# Patient Record
Sex: FEMALE | Race: WHITE | NOT HISPANIC OR LATINO | Employment: FULL TIME | URBAN - METROPOLITAN AREA
[De-identification: names, ages, dates, MRNs, and addresses within clinical notes are randomized per-mention and may not be internally consistent; named-entity substitution may affect disease eponyms.]

---

## 2017-01-10 ENCOUNTER — OFFICE VISIT (OUTPATIENT)
Dept: LAB | Facility: SCHOOL | Age: 32
End: 2017-01-10
Payer: COMMERCIAL

## 2017-01-10 VITALS
TEMPERATURE: 97.3 F | OXYGEN SATURATION: 100 % | HEART RATE: 73 BPM | RESPIRATION RATE: 16 BRPM | DIASTOLIC BLOOD PRESSURE: 80 MMHG | SYSTOLIC BLOOD PRESSURE: 120 MMHG

## 2017-01-10 DIAGNOSIS — R07.0 THROAT PAIN: Primary | ICD-10-CM

## 2017-01-10 LAB — S PYO AG THROAT QL IA.RAPID: NORMAL

## 2017-01-10 PROCEDURE — 87880 STREP A ASSAY W/OPTIC: CPT | Performed by: NURSE PRACTITIONER

## 2017-01-10 PROCEDURE — 99213 OFFICE O/P EST LOW 20 MIN: CPT | Performed by: NURSE PRACTITIONER

## 2017-01-10 RX ORDER — NORETHINDRONE ACETATE AND ETHINYL ESTRADIOL AND FERROUS FUMARATE 5-7-9-7
1 KIT ORAL DAILY
COMMUNITY
End: 2019-04-12 | Stop reason: ALTCHOICE

## 2017-01-10 NOTE — MR AVS SNAPSHOT
After Visit Summary   1/10/2017    Merlyn Markham    MRN: [id number]           Patient Information     Date Of Birth          1985        Visit Information        Provider Department      1/10/2017 3:30 PM Carolina Castaneda APRN [facility]        Today's Diagnoses     Throat pain    -  1       Care Instructions    Thank you for using the [facility].  If there is no improvement of your condition, please call and schedule an appointment with your primary care provider.    Strep was negative.              Sore Throat              What is a sore throat?   Sore throat is a common symptom that ranges in severity from just a sense of scratchiness to severe pain.   Pharyngitis is the medical term for sore throat.   How does it occur?   Sore throat is caused by inflammation of the throat (pharynx). The pharynx is the area behind the tonsils. A sore throat may be the first symptom of usually mild illnesses such as a cold or the flu or of more severe illnesses such as mononucleosis or scarlet fever.   A sore throat that comes on suddenly is called acute pharyngitis. It can be caused by bacteria or viruses. A sore throat that lasts for a long time is called chronic pharyngitis. It occurs when a respiratory, sinus, or mouth infection spreads to the throat.   Other causes of sore throats include:   hay fever   cigarette smoking or secondhand smoke   breathing heavily polluted air or chemical fumes   swallowing sharp foods that hurt the lining of the throat, such as a tortilla chip   dry air   heartburn (gastric reflux)   postnasal drip from draining sinuses.   What are the symptoms?   Symptoms may include:   a raw feeling in the throat that makes breathing, swallowing, and speaking painful   redness of the throat   fever   hoarseness   pus in your throat   tender, swollen glands (lymph nodes) in your neck   earache (you may feel pain in your ears even  though the problem is in your throat).   How is it diagnosed?   Your healthcare provider will ask about your recent medical history and your symptoms and examine your throat. Your provider also will examine you for signs of other illness, such as sinus, chest, or ear infections.   Just by looking at your throat, it is often hard for your healthcare provider to decide whether a virus or bacteria are causing your sore throat. Your provider may swab your throat to test for strep infection.   How is it treated?   Usually no specific medical treatment is needed if a virus is causing the sore throat. The throat most often gets better on its own within 5 to 7 days. Antibiotic medicine does not cure viral pharyngitis.   For acute pharyngitis caused by bacteria, your healthcare provider may prescribe an antibiotic.   For chronic pharyngitis, your provider will look for other causes, such as allergies.   How long will the effects last?   Viral pharyngitis often goes away in 5 to 7 days.   If you have bacterial pharyngitis, you will feel better after you have taken antibiotics for 2 to 3 days. You must, though, take all of your antibiotic even when you are feeling better. If you don't take all of it, your sore throat could come back.   How can I take care of myself?   Do not smoke.   Avoid secondhand smoke and other air pollutants.   Use a cool mist humidifier to add moisture to the air.   Get plenty of rest.   You may want to rest your throat by talking less and eating a diet that is mostly liquid or soft for a day or two. Avoid salty or spicy foods and citrus fruits.   Nonprescription throat lozenges and mouthwashes should help relieve the soreness.   Gargling with warm saltwater and drinking warm liquids may help. (You can make a saltwater solution by adding 1/4 teaspoon of salt to 8 ounces, or 240 mL, of warm water.)   A nonprescription pain reliever such as aspirin, acetaminophen, or ibuprofen may ease general aches and  pains. Check with your healthcare provider before you give any medicine that contains aspirin or salicylates to a child or teen. This includes medicines like baby aspirin, some cold medicines, and Pepto Bismol. Children and teens who take aspirin are at risk for a serious illness called Reye's syndrome.   If your sore throat lasts for more than a few days, call your healthcare provider.   How can I prevent a sore throat?   The following suggestions may help prevent a sore throat:   Don't share eating and drinking utensils with others.   Wash your hands often.   Don't let your nose or mouth touch public telephones or drinking fountains.   Avoid close contact with other people who have a sore throat.   Stay indoors as much as possible on high-pollution days.   Don't stay in areas where there is heavy smoke from cigarettes.   Use a humidifier in your home if the air is quite dry.               Published by [employer].  This content is reviewed periodically and is subject to change as new health information becomes available. The information is intended to inform and educate and is not a replacement for medical evaluation, advice, diagnosis or treatment by a healthcare professional.   Developed by [employer].   ? 2010 [employer] and/or its affiliates. All Rights Reserved.   Copyright   Clinical Reference Systems 2011             Follow-ups after your visit        Who to contact     If you have questions or need follow up information about today's clinic visit or your schedule please contact [place] [address] directly at [phone].  Normal or non-critical lab and imaging results will be communicated to you by MyChart, letter or phone within 4 business days after the clinic has received the results. If you do not hear from us within 7 days, please contact the clinic through MyChart or phone. If you have a critical or abnormal lab result, we will notify you by phone as soon as possible.  Submit  "refill requests through [email or web address] or call your pharmacy and they will forward the refill request to us. Please allow 3 business days for your refill to be completed.          Additional Information About Your Visit        [employer] Information     [email or web address] lets you send messages to your doctor, view your test results, renew your prescriptions, schedule appointments and more. To sign up, go to [email or web address] . Click on \"Log in\" on the left side of the screen, which will take you to the Welcome page. Then click on \"Sign up Now\" on the right side of the page.     You will be asked to enter the access code listed below, as well as some personal information. Please follow the directions to create your username and password.     Your access code is: [email or web address]  Expires: 4/10/2017  3:43 PM     Your access code will  in 90 days. If you need help or a new code, please call your [facility] or [phone].        Care EveryWhere ID     This is your Care EveryWhere ID. This could be used by other organizations to access your [facility] medical records  [id number]        Your Vitals Were     Pulse Temperature Respirations Pulse Oximetry Breastfeeding?       73 97.3  F (36.3  C) (Tympanic) 16 100% No        Blood Pressure from Last 3 Encounters:   01/10/17 120/80   16 132/80    Weight from Last 3 Encounters:   16 208 lb 12.8 oz (94.711 kg)              We Performed the Following     Rapid strep screen        Primary Care Provider Office Phone #    [facility] [phone]       No address on file        Thank you!     Thank you for choosing [place] [address]  for your care. Our goal is always to provide you with excellent care. Hearing back from our patients is one way we can continue to improve our services. Please take a few minutes to complete the written survey that you may receive in the mail after your visit with us. Thank you!             Your Updated Medication " List - Protect others around you: Learn how to safely use, store and throw away your medicines at www.disposemymeds.org.          This list is accurate as of: 1/10/17  3:43 PM.  Always use your most recent med list.                   Brand Name Dispense Instructions for use    norethindrone-ethinyl estradiol-iron 1-20/1-30/1-35 MG-MCG per tablet    ESTROSTEP FE     Take 1 tablet by mouth daily

## 2017-01-10 NOTE — NURSING NOTE
"Chief Complaint   Patient presents with     URI       Initial /80 mmHg  Pulse 73  Temp(Src) 97.3  F (36.3  C) (Tympanic)  Resp 16  SpO2 100%  Breastfeeding? No Estimated body mass index is 31.76 kg/(m^2) as calculated from the following:    Height as of 4/6/16: 5' 8\" (1.727 m).    Weight as of 4/6/16: 208 lb 12.8 oz (94.711 kg).  BP completed using cuff size: regular  "

## 2017-01-10 NOTE — PATIENT INSTRUCTIONS
Thank you for using the [facility].  If there is no improvement of your condition, please call and schedule an appointment with your primary care provider.    Strep was negative.              Sore Throat              What is a sore throat?   Sore throat is a common symptom that ranges in severity from just a sense of scratchiness to severe pain.   Pharyngitis is the medical term for sore throat.   How does it occur?   Sore throat is caused by inflammation of the throat (pharynx). The pharynx is the area behind the tonsils. A sore throat may be the first symptom of usually mild illnesses such as a cold or the flu or of more severe illnesses such as mononucleosis or scarlet fever.   A sore throat that comes on suddenly is called acute pharyngitis. It can be caused by bacteria or viruses. A sore throat that lasts for a long time is called chronic pharyngitis. It occurs when a respiratory, sinus, or mouth infection spreads to the throat.   Other causes of sore throats include:   hay fever   cigarette smoking or secondhand smoke   breathing heavily polluted air or chemical fumes   swallowing sharp foods that hurt the lining of the throat, such as a tortilla chip   dry air   heartburn (gastric reflux)   postnasal drip from draining sinuses.   What are the symptoms?   Symptoms may include:   a raw feeling in the throat that makes breathing, swallowing, and speaking painful   redness of the throat   fever   hoarseness   pus in your throat   tender, swollen glands (lymph nodes) in your neck   earache (you may feel pain in your ears even though the problem is in your throat).   How is it diagnosed?   Your healthcare provider will ask about your recent medical history and your symptoms and examine your throat. Your provider also will examine you for signs of other illness, such as sinus, chest, or ear infections.   Just by looking at your throat, it is often hard for your healthcare provider to decide  whether a virus or bacteria are causing your sore throat. Your provider may swab your throat to test for strep infection.   How is it treated?   Usually no specific medical treatment is needed if a virus is causing the sore throat. The throat most often gets better on its own within 5 to 7 days. Antibiotic medicine does not cure viral pharyngitis.   For acute pharyngitis caused by bacteria, your healthcare provider may prescribe an antibiotic.   For chronic pharyngitis, your provider will look for other causes, such as allergies.   How long will the effects last?   Viral pharyngitis often goes away in 5 to 7 days.   If you have bacterial pharyngitis, you will feel better after you have taken antibiotics for 2 to 3 days. You must, though, take all of your antibiotic even when you are feeling better. If you don't take all of it, your sore throat could come back.   How can I take care of myself?   Do not smoke.   Avoid secondhand smoke and other air pollutants.   Use a cool mist humidifier to add moisture to the air.   Get plenty of rest.   You may want to rest your throat by talking less and eating a diet that is mostly liquid or soft for a day or two. Avoid salty or spicy foods and citrus fruits.   Nonprescription throat lozenges and mouthwashes should help relieve the soreness.   Gargling with warm saltwater and drinking warm liquids may help. (You can make a saltwater solution by adding 1/4 teaspoon of salt to 8 ounces, or 240 mL, of warm water.)   A nonprescription pain reliever such as aspirin, acetaminophen, or ibuprofen may ease general aches and pains. Check with your healthcare provider before you give any medicine that contains aspirin or salicylates to a child or teen. This includes medicines like baby aspirin, some cold medicines, and Pepto Bismol. Children and teens who take aspirin are at risk for a serious illness called Reye's syndrome.   If your sore throat lasts for more than a few days, call your  healthcare provider.   How can I prevent a sore throat?   The following suggestions may help prevent a sore throat:   Don't share eating and drinking utensils with others.   Wash your hands often.   Don't let your nose or mouth touch public telephones or drinking fountains.   Avoid close contact with other people who have a sore throat.   Stay indoors as much as possible on high-pollution days.   Don't stay in areas where there is heavy smoke from cigarettes.   Use a humidifier in your home if the air is quite dry.               Published by [employer].  This content is reviewed periodically and is subject to change as new health information becomes available. The information is intended to inform and educate and is not a replacement for medical evaluation, advice, diagnosis or treatment by a healthcare professional.   Developed by [employer].   ? 2010 [employer] and/or its affiliates. All Rights Reserved.   Copyright   Clinical Reference Systems 2011

## 2017-01-10 NOTE — PROGRESS NOTES
SUBJECTIVE:                                                    Merlyn Markham is a 31 year old female who presents to clinic today for the following health issues:      ENT Symptoms             Symptoms: cc Present Absent Comment   Fever/Chills   x    Fatigue  x     Muscle Aches   x    Eye Irritation   x    Sneezing  x     Nasal Malvin/Drg  x     Sinus Pressure/Pain   x    Loss of smell   x    Dental pain   x    Sore Throat x x     Swollen Glands  x     Ear Pain/Fullness  x     Cough   x    Wheeze   x    Chest Pain   x    Shortness of breath   x    Rash   x    Other         Symptom duration:  24 hours   Symptom severity:  mod   Treatments tried:  Tylenol   Contacts:  school             Problem list and histories reviewed & adjusted, as indicated.  Additional history: she wants to make sure she doesn't have strep.  She had it last year and really didn't have much for symptoms at that time.  24 hours of a steady sore throat and is otherwise feeling pretty well.  Has had flu shot this season.    There is no problem list on file for this patient.    History reviewed. No pertinent past surgical history.    Social History   Substance Use Topics     Smoking status: Never Smoker      Smokeless tobacco: Not on file     Alcohol Use: Yes      Comment: very little     History reviewed. No pertinent family history.      Current Outpatient Prescriptions   Medication Sig Dispense Refill     norethindrone-ethinyl estradiol-iron (ESTROSTEP FE) 1-20/1-30/1-35 MG-MCG per tablet Take 1 tablet by mouth daily       No Known Allergies    10 point ROS of systems including Constitutional, Eyes, Respiratory, Cardiovascular, Gastroenterology, Genitourinary, Integumentary, Muscularskeletal, Psychiatric were all negative except for pertinent positives noted in my HPI.    OBJECTIVE:                                                    /80 mmHg  Pulse 73  Temp(Src) 97.3  F (36.3  C) (Tympanic)  Resp 16  SpO2 100%  Breastfeeding? No  There  is no weight on file to calculate BMI.  GENERAL: healthy, alert and no distress  HENT: ear canals and TM's normal, pharynx with mild erythema, tonsils normal   NECK: no adenopathy, no asymmetry  RESP: lungs clear to auscultation - no rales, rhonchi or wheezes  CV: regular rate and rhythm, normal S1 S2, no S3 or S4, no murmur  MS: no gross musculoskeletal defects noted      Diagnostic Test Results:  Results for orders placed or performed in visit on 01/10/17 (from the past 24 hour(s))   Rapid strep screen   Result Value Ref Range    Rapid Strep A Screen neg neg        ASSESSMENT/PLAN:                                                            1. Throat pain    - Rapid strep screen    See Patient Instructions  Strep was negative, continue with conservative treatment and watchful waiting. See handout about sore throat.    RAMOS Oconnor [facility]

## 2018-02-26 ENCOUNTER — OFFICE VISIT (OUTPATIENT)
Dept: LAB | Facility: SCHOOL | Age: 33
End: 2018-02-26
Payer: COMMERCIAL

## 2018-02-26 VITALS
TEMPERATURE: 97.1 F | SYSTOLIC BLOOD PRESSURE: 110 MMHG | HEART RATE: 113 BPM | WEIGHT: 262.2 LBS | RESPIRATION RATE: 18 BRPM | BODY MASS INDEX: 38.83 KG/M2 | OXYGEN SATURATION: 98 % | DIASTOLIC BLOOD PRESSURE: 86 MMHG | HEIGHT: 69 IN

## 2018-02-26 DIAGNOSIS — Z00.00 WELLNESS EXAMINATION: Primary | ICD-10-CM

## 2018-02-26 PROCEDURE — 99213 OFFICE O/P EST LOW 20 MIN: CPT

## 2018-02-26 NOTE — PATIENT INSTRUCTIONS
"Merlyn Markham has completed a Wellness Check at the [facility] on 2/26/2018.      ____________________________________________  [employer] PROVIDER                                                                               Wellness Visit Recommendations:      See your regular primary care health provider every year in order to help stay healthy.    Review health changes.     Review your medicines if your doctor has prescribed any.    Talk to your provider about how often to have your cholesterol checked.    If you are at risk for diabetes, you should have a diabetes test (fasting glucose).    Shots: Get a flu shot each year. Get a tetanus shot every 10 years.     Review with your primary care provider other immunizations that you may need based on your age and/or medical/surgical history    Nutrition:     Eat at least 5 servings of fruits and vegetables each day.    Eat whole-grain bread, whole-wheat pasta and brown rice instead of white grains and rice.    Preventive Care to be reviewed by your primary care provider:    Females:        Cervical Cancer Screening                          Breast Cancer Screening                          Colon Cancer Screening  Males:             Prostrate Cancer Screening                          Colon Cancer Screening      Lifestyle:    Exercise at least 150 minutes a week (30 minutes a day, 5 days of the week). This will help you control your weight and help prevent disease or manage disease.    Limit alcohol to one drink per day or less depending on your past medical history.    No smoking.     Wear sunscreen to prevent skin cancer.    See your dentist every six months for an exam and cleaning.    Today's Vital Signs:  /86 (BP Location: Right arm, Patient Position: Sitting, Cuff Size: Adult Large)  Pulse 113  Temp 97.1  F (36.2  C) (Tympanic)  Resp 18  Ht 5' 9\" (1.753 m)  Wt 262 lb 3.2 oz (118.9 kg)  SpO2 98%  BMI 38.72 " kg/m2

## 2018-02-26 NOTE — PROGRESS NOTES
"  SUBJECTIVE:  CC: Merlyn Markham is an 32 year old woman who presents for Wellness Check at [facility] [id number].     Review of Healthy Lifestyle: Currently 32 weeks pregnant.    Do you get at least three servings of calcium containing foods daily (dairy, green leafy vegetables, etc.)? yes     Do you have a high-fiber diet? yes     Amount of exercise or daily activities, outside of work: 2 day(s) per week for 1 hour    Do you wear sunscreen on a regular basis? Yes      Are you taking your medications regularly Yes     Have you had an eye exam in the past two years? unsure    Do you see a dentist twice per year? yes    Do you have sleep apnea, excessive snoring or excessive daytime drowsiness? no    Do you use tobacco in any form? no       OBJECTIVE:    Vitals: /86 (BP Location: Right arm, Patient Position: Sitting, Cuff Size: Adult Large)  Pulse 113  Temp 97.1  F (36.2  C) (Tympanic)  Resp 18  Ht 5' 9\" (1.753 m)  Wt 262 lb 3.2 oz (118.9 kg)  SpO2 98%  BMI 38.72 kg/m2  BMI= Body mass index is 38.72 kg/(m^2).    HEARING: Right Ear:        500Hz:  RESPONSE- on Level: 40 db   1000 Hz: RESPONSE- on Level: tone not heard   2000 Hz: RESPONSE- on Level: 40 db   4000 Hz: RESPONSE- on Level:   20 db     Left Ear:       500Hz:  RESPONSE- on Level: 40 db   1000 Hz: RESPONSE- on Level: tone not heard   2000 Hz: RESPONSE- on Level: 40 db   4000 Hz: RESPONSE- on Level:   20 db     VISION:  Right eye:  20/20  Left eye:     20/16  Corrective lenses?  Yes    Medication Reconciliation: complete    ASSESSMENT/PLAN:    COUNSELING:      reports that she has never smoked. She has never used smokeless tobacco.    Estimated body mass index is 38.72 kg/(m^2) as calculated from the following:    Height as of this encounter: 5' 9\" (1.753 m).    Weight as of this encounter: 262 lb 3.2 oz (118.9 kg).   Weight management plan: Patient was referred to their PCP to discuss a diet and exercise plan.    Counseling " Resources  [employer]  [email or web address]/      RAMOS Najera CNP    [employer] PROVIDER  [facility]

## 2018-02-26 NOTE — NURSING NOTE
"Chief Complaint   Patient presents with     Wellness Visit       Initial /86 (BP Location: Right arm, Patient Position: Sitting, Cuff Size: Adult Large)  Pulse 113  Temp 97.1  F (36.2  C) (Tympanic)  Resp 18  Ht 5' 9\" (1.753 m)  Wt 262 lb 3.2 oz (118.9 kg)  SpO2 98%  BMI 38.72 kg/m2 Estimated body mass index is 38.72 kg/(m^2) as calculated from the following:    Height as of this encounter: 5' 9\" (1.753 m).    Weight as of this encounter: 262 lb 3.2 oz (118.9 kg).  Medication Reconciliation: complete  "

## 2018-03-09 ENCOUNTER — OFFICE VISIT (OUTPATIENT)
Dept: LAB | Facility: SCHOOL | Age: 33
End: 2018-03-09
Payer: COMMERCIAL

## 2018-03-09 VITALS
SYSTOLIC BLOOD PRESSURE: 118 MMHG | BODY MASS INDEX: 38.8 KG/M2 | DIASTOLIC BLOOD PRESSURE: 78 MMHG | HEART RATE: 86 BPM | OXYGEN SATURATION: 97 % | WEIGHT: 262 LBS | TEMPERATURE: 97.7 F | HEIGHT: 69 IN

## 2018-03-09 DIAGNOSIS — J06.9 VIRAL URI: Primary | ICD-10-CM

## 2018-03-09 PROCEDURE — 99213 OFFICE O/P EST LOW 20 MIN: CPT | Performed by: FAMILY MEDICINE

## 2018-03-09 NOTE — PROGRESS NOTES
SUBJECTIVE:   Merlyn Markham is a 32 year old female who presents to clinic today for the following health issues:    ENT Symptoms             Symptoms: cc Present Absent Comment   Fever/Chills  x  Low grade fever last night    Fatigue   x    Muscle Aches   x    Eye Irritation   x    Sneezing   x    Nasal Malvin/Drg   x    Sinus Pressure/Pain   x    Loss of smell   x    Dental pain   x    Sore Throat x x     Swollen Glands   x    Ear Pain/Fullness   x    Cough  x  Mild cough - productive    Wheeze   x    Chest Pain   x    Shortness of breath   x    Rash   x    Other   x      Symptom duration:  1-2 days    Symptom severity:  Moderate    Treatments tried:  none   Contacts:  her son was positive for RSV      Currently 36 weeks pregnant. Baby moving normally, no cramping or spotting.    ROS: 10 point review of systems negative except as per HPI.    PAST MEDICAL HISTORY:  History reviewed. No pertinent past medical history.     ACTIVE MEDICAL PROBLEMS:  There is no problem list on file for this patient.       FAMILY HISTORY:  History reviewed. No pertinent family history.    SOCIAL HISTORY:  Social History     Social History     Marital status: Single     Spouse name: N/A     Number of children: N/A     Years of education: N/A     Occupational History     Not on file.     Social History Main Topics     Smoking status: Never Smoker     Smokeless tobacco: Never Used     Alcohol use No      Comment: very little     Drug use: No     Sexual activity: Yes     Partners: Male     Birth control/ protection: Pill     Other Topics Concern     Not on file     Social History Narrative       MEDICATIONS:  Current Outpatient Prescriptions   Medication Sig Dispense Refill     IRON PO Take 1 tablet by mouth       Prenatal Vit-Fe Fumarate-FA (PRENATAL ONE DAILY PO)        norethindrone-ethinyl estradiol-iron (ESTROSTEP FE) 1-20/1-30/1-35 MG-MCG per tablet Take 1 tablet by mouth daily         ALLERGIES:   No Known Allergies      OBJECTIVE:   "                                                  VITALS: /78  Pulse 86  Temp 97.7  F (36.5  C) (Tympanic)  Ht 5' 9\" (1.753 m)  Wt 262 lb (118.8 kg)  SpO2 97%  Breastfeeding? No  BMI 38.69 kg/m2  GENERAL: Pleasant, well appearing female.  HEENT: PERRL, EOMI, oropharynx normal, TMs normal.  NECK: supple, no thyromegaly or thyroid masses, shotty anterior cervical lymphadenopathy.  CV: RRR, no murmurs, rubs or gallops.  LUNGS: CTAB, normal effort.  ABDOMEN: Gravid. Doptone not available for FHTs. Visible fetal movement.  SKIN: warm and dry without obvious rashes.   EXTREMITIES: No edema.    ASSESSMENT/PLAN:                                                    1. Viral URI  Likely viral in etiology. Discussed OTC symptomatic cares. She was requesting RSV swab since her son has it. Discussed we do not have the swab here in clinic but positive or negative swab would not  which is supportive at this time. Treat fever with tylenol - do not use NSAIDs due to third trimester pregnancy. Follow-up if not improving or if worsening or if any decreased fetal movement or cramping/bleeding.      Follow-up: If not improving or if worsening    "

## 2018-03-09 NOTE — PATIENT INSTRUCTIONS
Treating Viral Respiratory Illness in Children  Viral respiratory illnesses include colds, the flu, and RSV (respiratory syncytial virus). Treatment will focus on relieving your child s symptoms and ensuring that the infection does not get worse. Antibiotics are not effective against viruses. Always see your child s healthcare provider if your child has trouble breathing.    Helping your child feel better    Give your child plenty of fluids, such as water or apple juice.    Make sure your child gets plenty of rest.    Keep your infant s nose clear. Use a rubber bulb suction device to remove mucus as needed. Don't be aggressive when suctioning. This may cause more swelling and discomfort.    Raise the head of your child's bed slightly to make breathing easier.    Run a cool-mist humidifier or vaporizer in your child s room to keep the air moist and nasal passages clear.    Don't let anyone smoke near your child.    Treat your child s fever with acetaminophen. In infants 6 months or older, you may use ibuprofen instead to help reduce the fever. Never give aspirin to a child under age 18. It could cause a rare but serious condition called Reye syndrome.  When to seek medical care  Most children get over colds and flu on their own in time, with rest and care from you. Call your child's healthcare provider if your child:    Has a fever of 100.4 F (38 C) in a baby younger than 3 months    Has a repeated fever of 104 F (40 C) or higher    Has nausea or vomiting, or can t keep even small amounts of liquid down    Hasn t urinated for 6 hours or more, or has dark or strong-smelling urine    Has a harsh cough, a cough that doesn't get better, wheezing, or trouble breathing    Has bad or increasing pain    Develops a skin rash    Is very tired or lethargic    Develops a blue color to the skin around the lips or on the fingers or toes  Date Last Reviewed: 1/1/2017 2000-2017 [employer]. [address] [facility],  [place] [address]. All rights reserved. This information is not intended as a substitute for professional medical care. Always follow your healthcare professional's instructions.

## 2018-03-09 NOTE — MR AVS SNAPSHOT
After Visit Summary   3/9/2018    Merlyn Markham    MRN: [id number]           Patient Information     Date Of Birth          1985        Visit Information        Provider Department      3/9/2018 7:30 AM Provider, [facility]        Care Instructions      Treating Viral Respiratory Illness in Children  Viral respiratory illnesses include colds, the flu, and RSV (respiratory syncytial virus). Treatment will focus on relieving your child s symptoms and ensuring that the infection does not get worse. Antibiotics are not effective against viruses. Always see your child s healthcare provider if your child has trouble breathing.    Helping your child feel better    Give your child plenty of fluids, such as water or apple juice.    Make sure your child gets plenty of rest.    Keep your infant s nose clear. Use a rubber bulb suction device to remove mucus as needed. Don't be aggressive when suctioning. This may cause more swelling and discomfort.    Raise the head of your child's bed slightly to make breathing easier.    Run a cool-mist humidifier or vaporizer in your child s room to keep the air moist and nasal passages clear.    Don't let anyone smoke near your child.    Treat your child s fever with acetaminophen. In infants 6 months or older, you may use ibuprofen instead to help reduce the fever. Never give aspirin to a child under age 18. It could cause a rare but serious condition called Reye syndrome.  When to seek medical care  Most children get over colds and flu on their own in time, with rest and care from you. Call your child's healthcare provider if your child:    Has a fever of 100.4 F (38 C) in a baby younger than 3 months    Has a repeated fever of 104 F (40 C) or higher    Has nausea or vomiting, or can t keep even small amounts of liquid down    Hasn t urinated for 6 hours or more, or has dark or strong-smelling urine    Has a harsh cough, a cough that  "doesn't get better, wheezing, or trouble breathing    Has bad or increasing pain    Develops a skin rash    Is very tired or lethargic    Develops a blue color to the skin around the lips or on the fingers or toes  Date Last Reviewed: 2017-2017 [employer]. [address] [place] [address]. All rights reserved. This information is not intended as a substitute for professional medical care. Always follow your healthcare professional's instructions.                Follow-ups after your visit        Who to contact     If you have questions or need follow up information about today's clinic visit or your schedule please contact [facility] directly at [phone].  Normal or non-critical lab and imaging results will be communicated to you by Imonomihart, letter or phone within 4 business days after the clinic has received the results. If you do not hear from us within 7 days, please contact the clinic through Imonomihart or phone. If you have a critical or abnormal lab result, we will notify you by phone as soon as possible.  Submit refill requests through [employer] or call your pharmacy and they will forward the refill request to us. Please allow 3 business days for your refill to be completed.          Additional Information About Your Visit        ImonomiharMimeo Information     [employer] lets you send messages to your doctor, view your test results, renew your prescriptions, schedule appointments and more. To sign up, go to [email or web address] . Click on \"Log in\" on the left side of the screen, which will take you to the Welcome page. Then click on \"Sign up Now\" on the right side of the page.     You will be asked to enter the access code listed below, as well as some personal information. Please follow the directions to create your username and password.     Your access code is: [email or web address]  Expires: 2018  8:12 AM     Your access code will  in 90 days. If you " "need help or a new code, please call your [place] clinic or [phone].        Care EveryWhere ID     This is your Care EveryWhere ID. This could be used by other organizations to access your [place] medical records  [id number]        Your Vitals Were     Pulse Temperature Height Pulse Oximetry Breastfeeding? BMI (Body Mass Index)    86 97.7  F (36.5  C) (Tympanic) 5' 9\" (1.753 m) 97% No 38.69 kg/m2       Blood Pressure from Last 3 Encounters:   03/09/18 118/78   02/26/18 110/86   01/10/17 120/80    Weight from Last 3 Encounters:   03/09/18 262 lb (118.8 kg)   02/26/18 262 lb 3.2 oz (118.9 kg)   04/06/16 208 lb 12.8 oz (94.7 kg)              Today, you had the following     No orders found for display       Primary Care Provider Office Phone # Fax #    [facility] [phone]       [address] [facility] [address]        Equal Access to Services     [facility]: Hadii aad ku hadasho Soomaali, waaxda luqadaha, qaybta kaalmada adedestin, daysi galindo . So [facility] [phone].    ATENCIÓN: Si habla español, tiene a shah disposición servicios gratuitos de asistencia lingüística. [facility] [phone].    We comply with applicable federal civil rights laws and [place] laws. We do not discriminate on the basis of race, color, national origin, age, disability, sex, sexual orientation, or gender identity.            Thank you!     Thank you for choosing [place] [address]  for your care. Our goal is always to provide you with excellent care. Hearing back from our patients is one way we can continue to improve our services. Please take a few minutes to complete the written survey that you may receive in the mail after your visit with us. Thank you!             Your Updated Medication List - Protect others around you: Learn how to safely use, store and throw away your medicines at www.disposemymeds.org.          This list is accurate as of " 3/9/18  7:53 AM.  Always use your most recent med list.                   Brand Name Dispense Instructions for use Diagnosis    IRON PO      Take 1 tablet by mouth        norethindrone-ethinyl estradiol-iron 1-20/1-30/1-35 MG-MCG per tablet    ESTROSTEP FE     Take 1 tablet by mouth daily        PRENATAL ONE DAILY PO

## 2018-03-09 NOTE — NURSING NOTE
"Initial /78  Pulse 86  Temp 97.7  F (36.5  C) (Tympanic)  Ht 5' 9\" (1.753 m)  Wt 262 lb (118.8 kg)  SpO2 97%  Breastfeeding? No  BMI 38.69 kg/m2 Estimated body mass index is 38.69 kg/(m^2) as calculated from the following:    Height as of this encounter: 5' 9\" (1.753 m).    Weight as of this encounter: 262 lb (118.8 kg). .    Shae Mota, REJI ([facility])  "

## 2018-04-14 ENCOUNTER — OFFICE VISIT (OUTPATIENT)
Dept: URGENT CARE | Facility: URGENT CARE | Age: 33
End: 2018-04-14
Payer: COMMERCIAL

## 2018-04-14 VITALS
WEIGHT: 235.5 LBS | HEIGHT: 69 IN | BODY MASS INDEX: 34.88 KG/M2 | TEMPERATURE: 97.7 F | DIASTOLIC BLOOD PRESSURE: 82 MMHG | SYSTOLIC BLOOD PRESSURE: 146 MMHG | HEART RATE: 98 BPM | OXYGEN SATURATION: 97 %

## 2018-04-14 DIAGNOSIS — N61.0 MASTITIS: Primary | ICD-10-CM

## 2018-04-14 PROCEDURE — 99213 OFFICE O/P EST LOW 20 MIN: CPT | Performed by: NURSE PRACTITIONER

## 2018-04-14 RX ORDER — CEPHALEXIN 500 MG/1
500 CAPSULE ORAL 4 TIMES DAILY
Qty: 40 CAPSULE | Refills: 0 | Status: SHIPPED | OUTPATIENT
Start: 2018-04-14 | End: 2019-04-12

## 2018-04-14 ASSESSMENT — ENCOUNTER SYMPTOMS
NEUROLOGICAL NEGATIVE: 1
HEMATOLOGIC/LYMPHATIC NEGATIVE: 1
GASTROINTESTINAL NEGATIVE: 1
FEVER: 1
PSYCHIATRIC NEGATIVE: 1
RESPIRATORY NEGATIVE: 1
CHILLS: 1
MUSCULOSKELETAL NEGATIVE: 1
ENDOCRINE NEGATIVE: 1
ALLERGIC/IMMUNOLOGIC NEGATIVE: 1
CARDIOVASCULAR NEGATIVE: 1
EYES NEGATIVE: 1

## 2018-04-14 NOTE — NURSING NOTE
"Chief Complaint   Patient presents with     Breast Problem       Initial /82 (BP Location: Right arm, Patient Position: Sitting, Cuff Size: Adult Large)  Pulse 98  Temp 97.7  F (36.5  C) (Oral)  Ht 5' 9\" (1.753 m)  Wt 235 lb 8 oz (106.8 kg)  SpO2 97%  Breastfeeding? Yes  BMI 34.78 kg/m2 Estimated body mass index is 34.78 kg/(m^2) as calculated from the following:    Height as of this encounter: 5' 9\" (1.753 m).    Weight as of this encounter: 235 lb 8 oz (106.8 kg).  Medication Reconciliation: rajendra CUNNINGHAM, Certified Medical Assistant (AAMA)April 14, 2018 12:02 PM      "

## 2018-04-14 NOTE — MR AVS SNAPSHOT
After Visit Summary   4/14/2018    Merlyn Markham    MRN: [id number]           Patient Information     Date Of Birth          1985        Visit Information        Provider Department      4/14/2018 11:55 AM Sophie Downey APRN [facility] [place]        Care Instructions      Mastitis  Mastitis occurs when breast tissue becomes swollen and inflamed. This is almost always due to infection. Mastitis most often affects breastfeeding women during the first 6 weeks after childbirth. For this reason, it s also known as lactation mastitis. Infection may happen after a duct becomes clogged, causing milk to back up in the breast. Mastitis may also occur if bacteria enter the breast through small cracks in the nipple. (Less often, mastitis occurs in women who aren t breastfeeding. If you have mastitis that is not due to breastfeeding, your healthcare provider will give you more information as needed. Treatment may include some of the same home care measures listed below.)  Mastitis may cause flu-like symptoms such as fever, aches, and fatigue. The affected breast may feel painful, warm, tender, firm, or swollen. The skin over the breast may be red (often in a wedge-shaped pattern). You may feel a burning a sensation when breastfeeding.  In most cases, mastitis can be treated with antibiotics. This should clear the infection. If treatment is delayed, a pocket of pus (abscess) can form in the breast tissue. A procedure may then be needed to drain the pus. In severe cases of infection, other treatments may be needed.  Home care  Breastfeeding    It s very important to keep the milk flowing from the infected breast. Continue breastfeeding from both breasts as usual. This will not hurt the baby. If this is too painful, use a breast pump to remove milk from the infected side. This can be fed to your baby or discarded. Note: If you don't continue to breastfeed or pump your breast, bacteria can grow in  the milk that is left in your breast. This can make your infection worse.    Tell your healthcare provider if you have problems with breastfeeding. He or she may suggest changes to your technique, if needed. You may also be referred to a lactation nurse or consultant for support with breastfeeding.  General care    Take any medicines you re prescribed as directed. If you re taking antibiotics, be sure to complete all of the medicine even if you start to feel better. Over-the-counter pain medicines may also be recommended. Don t use breast creams or other products or medicines without talking to your healthcare provider first. Note: If you re concerned about taking medicines while breastfeeding, talk to your healthcare provider.    Rest as often as needed. Also be sure to drink plenty of fluids.    To help relieve pain and swelling, heat or ice may be used. Apply as often as directed by your provider.    Heat: Place a warm compress on the breast. Use a towel soaked in hot water, a heating pad, or a hot water bottle.    Cold: Place a cold compress on the breast. Use an ice pack or bag of ice wrapped in a thin towel. Never place a cold source directly on the skin.  Follow-up care  Follow up with your healthcare provider as advised.  When to seek medical advice  Call your healthcare provider right away if any of these occur:    Fever of 100.4 F (38 C) or higher, or as directed by your provider    Shaking chills    Symptoms worsen or do not improve within 48 to 72 hours of starting treatment    New symptoms develop  Date Last Reviewed: 7/30/2015 2000-2017 [employer]. [address] [place] [address], [place] [address]. All rights reserved. This information is not intended as a substitute for professional medical care. Always follow your healthcare professional's instructions.                Follow-ups after your visit        Who to contact     If you have questions or need follow up information about today's  "clinic visit or your schedule please contact [facility] directly at [phone].  Normal or non-critical lab and imaging results will be communicated to you by MyChart, letter or phone within 4 business days after the clinic has received the results. If you do not hear from us within 7 days, please contact the clinic through [employer] or phone. If you have a critical or abnormal lab result, we will notify you by phone as soon as possible.  Submit refill requests through [employer] or call your pharmacy and they will forward the refill request to us. Please allow 3 business days for your refill to be completed.          Additional Information About Your Visit        MyChart Information     [employer] lets you send messages to your doctor, view your test results, renew your prescriptions, schedule appointments and more. To sign up, go to [email or web address] . Click on \"Log in\" on the left side of the screen, which will take you to the Welcome page. Then click on \"Sign up Now\" on the right side of the page.     You will be asked to enter the access code listed below, as well as some personal information. Please follow the directions to create your username and password.     Your access code is: [email or web address]  Expires: 2018  9:12 AM     Your access code will  in 90 days. If you need help or a new code, please call your [place] clinic or [phone].        Care EveryWhere ID     This is your Care EveryWhere ID. This could be used by other organizations to access your [place] medical records  [id number]        Your Vitals Were     Pulse Temperature Height Pulse Oximetry Breastfeeding? BMI (Body Mass Index)    98 97.7  F (36.5  C) (Oral) 5' 9\" (1.753 m) 97% Yes 34.78 kg/m2       Blood Pressure from Last 3 Encounters:   18 146/82   18 118/78   18 110/86    Weight from Last 3 Encounters:   18 235 lb 8 oz (106.8 kg)   18 262 lb (118.8 kg)   18 262 lb 3.2 oz (118.9 " kg)              Today, you had the following     No orders found for display       Primary Care Provider Office Phone # Fax #    [facility] [phone]       [address] [address] [facility] [address]        Equal Access to Services     SULEMA DE JESUS : Hadii daniella ku hadabhijito Soomaali, waaxda luqadaha, qaybta kaalmada adeegyada, waxdonna idiin haysilvian adejesenia krause mateo singer. So [facility] [phone].    ATENCIÓN: Si habla español, tiene a shah disposición servicios gratuitos de asistencia lingüística. Llame al [phone].    We comply with applicable federal civil rights laws and [place] laws. We do not discriminate on the basis of race, color, national origin, age, disability, sex, sexual orientation, or gender identity.            Thank you!     Thank you for choosing [facility]  [facility]. Our goal is always to provide you with excellent care. Hearing back from our patients is one way we can continue to improve our services. Please take a few minutes to complete the written survey that you may receive in the mail after your visit with us. Thank you!             Your Updated Medication List - Protect others around you: Learn how to safely use, store and throw away your medicines at www.disposemymeds.org.          This list is accurate as of 4/14/18 12:10 PM.  Always use your most recent med list.                   Brand Name Dispense Instructions for use Diagnosis    IRON PO      Take 1 tablet by mouth        norethindrone-ethinyl estradiol-iron 1-20/1-30/1-35 MG-MCG per tablet    ESTROSTEP FE     Take 1 tablet by mouth daily        PRENATAL ONE DAILY PO

## 2018-04-14 NOTE — PATIENT INSTRUCTIONS
Mastitis  Mastitis occurs when breast tissue becomes swollen and inflamed. This is almost always due to infection. Mastitis most often affects breastfeeding women during the first 6 weeks after childbirth. For this reason, it s also known as lactation mastitis. Infection may happen after a duct becomes clogged, causing milk to back up in the breast. Mastitis may also occur if bacteria enter the breast through small cracks in the nipple. (Less often, mastitis occurs in women who aren t breastfeeding. If you have mastitis that is not due to breastfeeding, your healthcare provider will give you more information as needed. Treatment may include some of the same home care measures listed below.)  Mastitis may cause flu-like symptoms such as fever, aches, and fatigue. The affected breast may feel painful, warm, tender, firm, or swollen. The skin over the breast may be red (often in a wedge-shaped pattern). You may feel a burning a sensation when breastfeeding.  In most cases, mastitis can be treated with antibiotics. This should clear the infection. If treatment is delayed, a pocket of pus (abscess) can form in the breast tissue. A procedure may then be needed to drain the pus. In severe cases of infection, other treatments may be needed.  Home care  Breastfeeding    It s very important to keep the milk flowing from the infected breast. Continue breastfeeding from both breasts as usual. This will not hurt the baby. If this is too painful, use a breast pump to remove milk from the infected side. This can be fed to your baby or discarded. Note: If you don't continue to breastfeed or pump your breast, bacteria can grow in the milk that is left in your breast. This can make your infection worse.    Tell your healthcare provider if you have problems with breastfeeding. He or she may suggest changes to your technique, if needed. You may also be referred to a lactation nurse or consultant for support with  breastfeeding.  General care    Take any medicines you re prescribed as directed. If you re taking antibiotics, be sure to complete all of the medicine even if you start to feel better. Over-the-counter pain medicines may also be recommended. Don t use breast creams or other products or medicines without talking to your healthcare provider first. Note: If you re concerned about taking medicines while breastfeeding, talk to your healthcare provider.    Rest as often as needed. Also be sure to drink plenty of fluids.    To help relieve pain and swelling, heat or ice may be used. Apply as often as directed by your provider.    Heat: Place a warm compress on the breast. Use a towel soaked in hot water, a heating pad, or a hot water bottle.    Cold: Place a cold compress on the breast. Use an ice pack or bag of ice wrapped in a thin towel. Never place a cold source directly on the skin.  Follow-up care  Follow up with your healthcare provider as advised.  When to seek medical advice  Call your healthcare provider right away if any of these occur:    Fever of 100.4 F (38 C) or higher, or as directed by your provider    Shaking chills    Symptoms worsen or do not improve within 48 to 72 hours of starting treatment    New symptoms develop  Date Last Reviewed: 7/30/2015 2000-2017 [employer]. [address] [place] [address], [place] [address]. All rights reserved. This information is not intended as a substitute for professional medical care. Always follow your healthcare professional's instructions.

## 2018-04-14 NOTE — PROGRESS NOTES
"SUBJECTIVE:   Merlyn Markham is a 32 year old female presenting with a chief complaint of mastitis.     She is an established patient of [facility].    Patient is nursing and last night noticed flu like symptoms fever chills headache.    This morning left breast red and painful. Took ibuprofen for pain.  Still nursing ok, baby is 2 weeks old.     Review of Systems   Constitutional: Positive for chills and fever.   HENT: Negative.    Eyes: Negative.    Respiratory: Negative.    Cardiovascular: Negative.    Gastrointestinal: Negative.    Endocrine: Negative.    Genitourinary: Negative.    Musculoskeletal: Negative.    Skin: Negative.    Allergic/Immunologic: Negative.    Neurological: Negative.    Hematological: Negative.    Psychiatric/Behavioral: Negative.        No past medical history on file.  No family history on file.  Current Outpatient Prescriptions   Medication Sig Dispense Refill     IRON PO Take 1 tablet by mouth       Prenatal Vit-Fe Fumarate-FA (PRENATAL ONE DAILY PO)        norethindrone-ethinyl estradiol-iron (ESTROSTEP FE) 1-20/1-30/1-35 MG-MCG per tablet Take 1 tablet by mouth daily       Social History   Substance Use Topics     Smoking status: Never Smoker     Smokeless tobacco: Never Used     Alcohol use No      Comment: very little       OBJECTIVE  /82 (BP Location: Right arm, Patient Position: Sitting, Cuff Size: Adult Large)  Pulse 98  Temp 97.7  F (36.5  C) (Oral)  Ht 5' 9\" (1.753 m)  Wt 235 lb 8 oz (106.8 kg)  SpO2 97%  Breastfeeding? Yes  BMI 34.78 kg/m2    Physical Exam   Constitutional: She is oriented to person, place, and time. She appears well-developed and well-nourished.   Cardiovascular: Normal rate and regular rhythm.    Pulmonary/Chest: Effort normal and breath sounds normal.   Neurological: She is alert and oriented to person, place, and time.   Skin:                ASSESSMENT:    (N61.0) Mastitis  (primary encounter diagnosis)    Plan: cephALEXin (KEFLEX) 500 MG capsule " QID X 10 days  Increase fluids, rest, keep breastfeeding.     Followup:    If not improving or if condition worsens, follow up with your Primary Care Provider    RAMOS Pizano CNP      Patient Instructions     Mastitis  Mastitis occurs when breast tissue becomes swollen and inflamed. This is almost always due to infection. Mastitis most often affects breastfeeding women during the first 6 weeks after childbirth. For this reason, it s also known as lactation mastitis. Infection may happen after a duct becomes clogged, causing milk to back up in the breast. Mastitis may also occur if bacteria enter the breast through small cracks in the nipple. (Less often, mastitis occurs in women who aren t breastfeeding. If you have mastitis that is not due to breastfeeding, your healthcare provider will give you more information as needed. Treatment may include some of the same home care measures listed below.)  Mastitis may cause flu-like symptoms such as fever, aches, and fatigue. The affected breast may feel painful, warm, tender, firm, or swollen. The skin over the breast may be red (often in a wedge-shaped pattern). You may feel a burning a sensation when breastfeeding.  In most cases, mastitis can be treated with antibiotics. This should clear the infection. If treatment is delayed, a pocket of pus (abscess) can form in the breast tissue. A procedure may then be needed to drain the pus. In severe cases of infection, other treatments may be needed.  Home care  Breastfeeding    It s very important to keep the milk flowing from the infected breast. Continue breastfeeding from both breasts as usual. This will not hurt the baby. If this is too painful, use a breast pump to remove milk from the infected side. This can be fed to your baby or discarded. Note: If you don't continue to breastfeed or pump your breast, bacteria can grow in the milk that is left in your breast. This can make your infection worse.    Tell your  healthcare provider if you have problems with breastfeeding. He or she may suggest changes to your technique, if needed. You may also be referred to a lactation nurse or consultant for support with breastfeeding.  General care    Take any medicines you re prescribed as directed. If you re taking antibiotics, be sure to complete all of the medicine even if you start to feel better. Over-the-counter pain medicines may also be recommended. Don t use breast creams or other products or medicines without talking to your healthcare provider first. Note: If you re concerned about taking medicines while breastfeeding, talk to your healthcare provider.    Rest as often as needed. Also be sure to drink plenty of fluids.    To help relieve pain and swelling, heat or ice may be used. Apply as often as directed by your provider.    Heat: Place a warm compress on the breast. Use a towel soaked in hot water, a heating pad, or a hot water bottle.    Cold: Place a cold compress on the breast. Use an ice pack or bag of ice wrapped in a thin towel. Never place a cold source directly on the skin.  Follow-up care  Follow up with your healthcare provider as advised.  When to seek medical advice  Call your healthcare provider right away if any of these occur:    Fever of 100.4 F (38 C) or higher, or as directed by your provider    Shaking chills    Symptoms worsen or do not improve within 48 to 72 hours of starting treatment    New symptoms develop  Date Last Reviewed: 7/30/2015 2000-2017 [employer]. [address] [place] [address], [place] [address]. All rights reserved. This information is not intended as a substitute for professional medical care. Always follow your healthcare professional's instructions.

## 2019-02-15 ENCOUNTER — HEALTH MAINTENANCE LETTER (OUTPATIENT)
Age: 34
End: 2019-02-15

## 2019-04-12 ENCOUNTER — OFFICE VISIT (OUTPATIENT)
Dept: LAB | Facility: SCHOOL | Age: 34
End: 2019-04-12
Payer: COMMERCIAL

## 2019-04-12 VITALS
RESPIRATION RATE: 16 BRPM | OXYGEN SATURATION: 98 % | BODY MASS INDEX: 32.17 KG/M2 | DIASTOLIC BLOOD PRESSURE: 74 MMHG | WEIGHT: 217.2 LBS | TEMPERATURE: 97.1 F | SYSTOLIC BLOOD PRESSURE: 116 MMHG | HEIGHT: 69 IN | HEART RATE: 69 BPM

## 2019-04-12 DIAGNOSIS — H57.9 ABNORMAL VISION SCREEN: ICD-10-CM

## 2019-04-12 DIAGNOSIS — R94.120 ABNORMAL HEARING SCREEN: ICD-10-CM

## 2019-04-12 DIAGNOSIS — Z00.00 WELLNESS EXAMINATION: Primary | ICD-10-CM

## 2019-04-12 LAB
CHOLEST SERPL-MCNC: 222 MG/DL
GLUCOSE SERPL-MCNC: 82 MG/DL (ref 70–99)
HDLC SERPL-MCNC: 37 MG/DL
LDLC SERPL CALC-MCNC: 129 MG/DL
NONHDLC SERPL-MCNC: 185 MG/DL
TRIGL SERPL-MCNC: 279 MG/DL

## 2019-04-12 PROCEDURE — 80061 LIPID PANEL: CPT | Performed by: NURSE PRACTITIONER

## 2019-04-12 PROCEDURE — 99213 OFFICE O/P EST LOW 20 MIN: CPT

## 2019-04-12 PROCEDURE — 82947 ASSAY GLUCOSE BLOOD QUANT: CPT

## 2019-04-12 PROCEDURE — 36415 COLL VENOUS BLD VENIPUNCTURE: CPT

## 2019-04-12 PROCEDURE — 80061 LIPID PANEL: CPT

## 2019-04-12 RX ORDER — ACETAMINOPHEN AND CODEINE PHOSPHATE 120; 12 MG/5ML; MG/5ML
0.35 SOLUTION ORAL DAILY
Refills: 3 | COMMUNITY
Start: 2019-01-26 | End: 2021-01-22

## 2019-04-12 ASSESSMENT — MIFFLIN-ST. JEOR: SCORE: 1750.62

## 2019-04-12 NOTE — PATIENT INSTRUCTIONS
"                Merlyn Markham has completed a Wellness Check at the [facility] on 4/12/2019.      ____________________________________________  [place] [employer]CHO[employer] PROVIDER                                                                               Wellness Visit Recommendations:      See your regular primary care health provider every year in order to help stay healthy.    Review health changes.     Review your medicines if your doctor has prescribed any.    Talk to your provider about how often to have your cholesterol checked.    If you are at risk for diabetes, you should have a diabetes test (fasting glucose).    Shots: Get a flu shot each year. Get a tetanus shot every 10 years.     Review with your primary care provider other immunizations that you may need based on your age and/or medical/surgical history    Nutrition:     Eat at least 5 servings of fruits and vegetables each day.    Eat whole-grain bread, whole-wheat pasta and brown rice instead of white grains and rice.    Preventive Care to be reviewed by your primary care provider:    Females:        Cervical Cancer Screening                          Breast Cancer Screening                          Colon Cancer Screening  Males:             Prostrate Cancer Screening                          Colon Cancer Screening      Lifestyle:    Exercise at least 150 minutes a week (30 minutes a day, 5 days of the week). This will help you control your weight and help prevent disease or manage disease.    Limit alcohol to one drink per day or less depending on your past medical history.    No smoking.     Wear sunscreen to prevent skin cancer.    See your dentist every six months for an exam and cleaning.    Today's Vital Signs:  /74 (BP Location: Right arm, Patient Position: Sitting, Cuff Size: Adult Large)   Pulse 69   Temp 97.1  F (36.2  C) (Tympanic)   Resp 16   Ht 1.746 m (5' 8.75\")   Wt 98.5 kg (217 lb 3.2 oz)   SpO2 98%   BMI " 32.31 kg/m       Please follow up with your primary care provider for any abnormal values.    Your hearing screening -abnormal.  Your vision screening - abnormal.    Your blood pressure is   BP Readings from Last 3 Encounters:   04/12/19 116/74   04/14/18 146/82   03/09/18 118/78       A blood pressure reading is made up of two numbers: a higher number over a lower number. The top number is the systolic pressure. The bottom number is the diastolic pressure. A normal blood pressure is a systolic pressure of less than 120 over a diastolic pressure of less than 80. You will see your blood pressure readings written together. For example, a person with a systolic pressure of 118 and a diastolic pressure of 78 will have 118/78 written in the medical record.      High blood pressure is when either the top number is 140 or higher, or the bottom number is 90 or higher. This must be the result when taking your blood pressure a number of times.   The blood pressures between normal and high are called prehypertension. This is systolic pressure of 120 to 140 or diastolic pressure of 80 to 89. Prehypertension means you are at risk of getting high blood pressure. It's a warning sign. The information gives you a chance to  make lifestyle changes such as weight loss, exercise, and quitting smoking, that can keep your blood pressure from going higher. You should have your blood pressure checked regularly to be sure it isn t rising.    High blood pressure is a condition that puts you at risk for heart attack, stroke, and kidney disease. It does not usually cause symptoms. But it can be serious.    You have a lot of control over your blood pressure. To lower it:  ?Lose weight (if you are overweight)  ?Stop using tobacco if you smoke (incuding e-cigs) or chew.  ?Choose a diet low in fat and rich in fruits, vegetables, and whole grains.  ?Reduce the amount of salt you eat  ?Do something active for at least 30 minutes a day on most days  "of the week  ?Cut down on alcohol (if you drink more than 2 alcoholic drinks per day)    Your BMI is 32.3    Body mass index (BMI) classifications are based upon risk of cardiovascular disease. The higher your BMI, the greater your risk for weight-related health problems.      BMI below 18.5: Underweight    BMI 18.5 to 24.9: Healthy weight or \"ideal body weight\"     BMI 25 to 29.9: Overweight    BMI 30 and over: Obese    BMI 40 and over: Severe or Morbid obesity       For some information on a healthy diet you can visit: [email or web address]-[email or web address]-[email or web address]-diet/art-86438861    Today you have had your cholesterol and glucose checked. You will receive a letter in the mail with your cholesterol results. Your glucose is 82  Results for orders placed or performed in visit on 01/10/17   Rapid strep screen   Result Value Ref Range    Rapid Strep A Screen neg neg   .    Glucose is a measure of blood sugar and is one of the tests for diabetes. If you have not been fasting, your level will often be high. A low glucose level may be a cause of weakness or dizziness. Blood sugar ranks with cholesterol as a causative factor in arteriosclerosis and heart attacks.     Cholesterol is one of the blood fats (lipids) and is the building block used by the body for cell wall and hormone production. Increased levels of cholesterol have been proven to directly contribute to heart disease and strokes.   Triglycerides are one of the blood fats (lipids) and are thought to be associated with heart disease. If you have had anything to eat or drink other than water for 12 hours before the test and your level is high, you should have the test repeated after a 12 hour fast. Abnormally high results may also be associated with diabetes, kidney and liver diseases.   LDL is the low density lipoprotein component of the cholesterol. It is the harmful substance that deposits cholesterol on the " "artery walls contributing to heart attacks and strokes. A high LDL level is associated with higher risk of coronary heart disease.   HDL stands for high density lipoprotein and refers to the so-called \"good\" cholesterol. HDL picks up excess cholesterol in the bloodstream and carries it back to the liver for disposal. Individuals with higher than average HDL seem to have a lower risk of coronary disease. Vigorous exercise will help increase the blood levels of HDL.   Risk Factor (Total cholesterol/HDL) is a commonly used ratio for cardiac risk assessment. Less than 5.0 for men and 4.4 for women is ideal.       Health studies have shown that smoking can affect your heart as well as your lungs. Smoking also raises your risk of certain cancers. These are all good reasons to quit.  Smoking has been linked with many serious illnesses. It also has been shown to increase signs of aging. A few of the health effects of smoking are listed below. Smoking can:    Increase your risk of lung cancer, bladder cancer, and cervical cancer.    Damage your lungs and cause problems with breathing such as emphysema and COPD (chronic obstructive pulmonary disease)    Raise blood pressure, which increases your risk of heart attack or stroke.    Reduce blood flow, which can slow healing and cause wrinkles.    In pregnant women, cause bleeding problems, miscarriage, stillbirth, or birth defects.    In men, cause problems with erections.    Quitting smoking has major and immediate health benefits for men and women of all ages. The earlier you quit, the greater the benefits. People who quit smoking before age 50 reduce their risk of dying over the next 15 years by one-half, as compared with those who continue to smoke. Quitting smoking is also important to those who do not smoke, since being exposed to secondhand cigarette smoke is responsible for a number of serious health conditions.    After deciding to quit smoking, the first step is " usually to set a quit date. This is the day when you will completely quit smoking. Ideally, this date should be in the next two weeks, although choosing a special date (eg, birthday, anniversary, or holiday) is another option.  Some people switch to a brand of cigarettes that is lower in tar and nicotine before quitting, but this frequently causes the person to inhale more often or more deeply; low tar and low nicotine cigarettes have no known benefits and are not recommended. Reducing the number of cigarettes smoked prior to the quit date is recommended by some as a means of preparation.  Other steps that may help in preparing to quit include the following:  ?Tell family, friends, and coworkers about the plan to quit and ask for their support.  ?Avoid smoking in the home and car and other places where you spend a lot of time.  ?Review other quit attempts. What worked? What did not work? What contributed to relapse?  ?Prepare to deal with nicotine withdrawal symptoms, including anxiety, frustration, depression, and intense cravings to smoke. Recalling previous quit attempts may help anticipate these symptoms. Withdrawal symptoms usually become manageable within a few weeks of stopping completely.  ?Prepare to deal with things that trigger smoking. Examples include having smokers in the household or workplace, stressful situations, and drinking alcohol. A vacation from work may be an easier time to quit, particularly if you smoke during work breaks.  ?Talk with a health care provider about ways to quit smoking. Changing behaviors and taking a medication are the two main methods of quitting smoking. You are more likely to quit if you use both methods together.    Visit www.quitplan.com for resources and support to quit smoking.

## 2019-04-12 NOTE — PROGRESS NOTES
"  SUBJECTIVE:  CC: Merlyn Markham is an 33 year old woman who presents for Wellness Check at [facility] [id number].     Review of Healthy Lifestyle:    Do you get at least three servings of calcium containing foods daily (dairy, green leafy vegetables, etc.)? yes     Do you have a high-fiber diet? yes     Amount of exercise or daily activities, outside of work: 2 day(s) per week for 30 to 45 min     Do you wear sunscreen on a regular basis? Yes      Are you taking your medications regularly Yes     Have you had an eye exam in the past two years? no    Do you see a dentist twice per year? yes    Do you have sleep apnea, excessive snoring or excessive daytime drowsiness? no    Do you use tobacco in any form? no       OBJECTIVE:    Vitals: /74 (BP Location: Right arm, Patient Position: Sitting, Cuff Size: Adult Large)   Pulse 69   Temp 97.1  F (36.2  C) (Tympanic)   Resp 16   Ht 1.746 m (5' 8.75\")   Wt 98.5 kg (217 lb 3.2 oz)   SpO2 98%   BMI 32.31 kg/m    BMI= Body mass index is 32.31 kg/m .    HEARING: Right Ear:        500Hz:  RESPONSE- on Level: 25 db   1000 Hz: RESPONSE- on Level: tone not heard   2000 Hz: RESPONSE- on Level: tone not heard   4000 Hz: RESPONSE- on Level:   20 db     Left Ear:       500Hz:  RESPONSE- on Level:   20 db    1000 Hz: RESPONSE- on Level: tone not heard   2000 Hz: RESPONSE- on Level: 25 db   4000 Hz: RESPONSE- on Level:   20 db     VISION:  Right eye:  20/40  Left eye:     20/25  Corrective lenses?  Yes, did not use for exam    Medication Reconciliation: complete    ASSESSMENT/PLAN: Referred to ENT for evaluation, worsening hearing screening from last year. Instructed to see her ophthalmologist.    COUNSELING:      reports that she has never smoked. She has never used smokeless tobacco.    Estimated body mass index is 32.31 kg/m  as calculated from the following:    Height as of this encounter: 1.746 m (5' 8.75\").    Weight as of this encounter: 98.5 kg " (217 lb 3.2 oz).   Weight management plan: Discussed healthy diet and exercise guidelines    Counseling Resources  [place]'s My[employer]  https://www.quitplan.com/    RAMOS Najera [facility] SCHOOL PROVIDER  [facility]

## 2019-05-14 ENCOUNTER — OFFICE VISIT (OUTPATIENT)
Dept: OTOLARYNGOLOGY | Facility: CLINIC | Age: 34
End: 2019-05-14
Payer: COMMERCIAL

## 2019-05-14 ENCOUNTER — OFFICE VISIT (OUTPATIENT)
Dept: AUDIOLOGY | Facility: CLINIC | Age: 34
End: 2019-05-14
Payer: COMMERCIAL

## 2019-05-14 VITALS
HEIGHT: 69 IN | TEMPERATURE: 98.2 F | WEIGHT: 217 LBS | SYSTOLIC BLOOD PRESSURE: 130 MMHG | DIASTOLIC BLOOD PRESSURE: 72 MMHG | BODY MASS INDEX: 32.14 KG/M2 | HEART RATE: 71 BPM

## 2019-05-14 DIAGNOSIS — H90.3 SENSORINEURAL HEARING LOSS, BILATERAL: Primary | ICD-10-CM

## 2019-05-14 DIAGNOSIS — R94.120 FAILED HEARING SCREENING: Primary | ICD-10-CM

## 2019-05-14 PROCEDURE — 99207 ZZC NO CHARGE LOS: CPT | Performed by: AUDIOLOGIST

## 2019-05-14 PROCEDURE — 92550 TYMPANOMETRY & REFLEX THRESH: CPT | Performed by: AUDIOLOGIST

## 2019-05-14 PROCEDURE — 92557 COMPREHENSIVE HEARING TEST: CPT | Performed by: AUDIOLOGIST

## 2019-05-14 PROCEDURE — 99203 OFFICE O/P NEW LOW 30 MIN: CPT | Performed by: OTOLARYNGOLOGY

## 2019-05-14 ASSESSMENT — MIFFLIN-ST. JEOR: SCORE: 1749.72

## 2019-05-14 NOTE — NURSING NOTE
"Initial /72 (BP Location: Left arm, Patient Position: Sitting, Cuff Size: Adult Regular)   Pulse 71   Temp 98.2  F (36.8  C) (Oral)   Ht 1.746 m (5' 8.75\")   Wt 98.4 kg (217 lb)   BMI 32.28 kg/m   Estimated body mass index is 32.28 kg/m  as calculated from the following:    Height as of this encounter: 1.746 m (5' 8.75\").    Weight as of this encounter: 98.4 kg (217 lb). .    Emelina Cee CMA    "

## 2019-05-14 NOTE — PROGRESS NOTES
History of Present Illness - Merlyn Markham is a 33 year old female who presents with concerns about her hearing. She has known for years that she has trouble hearing in groups. 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 She had several ear infections in childhood, but did not have any ear surgery. She denies vertigo.    Past Medical History -   No prior surgery.    Current Medications -   Current Outpatient Medications:      norethindrone (MICRONOR) 0.35 MG tablet, Take 0.35 mg by mouth daily, Disp: , Rfl: 3     sertraline (ZOLOFT) 50 MG tablet, Take 50 mg by mouth daily, Disp: , Rfl: 1    Allergies - No Known Allergies    Social History -   Social History     Socioeconomic History     Marital status: Single     Spouse name: Not on file     Number of children: Not on file     Years of education: Not on file     Highest education level: Not on file   Occupational History     Not on file   Social Needs     Financial resource strain: Not on file     Food insecurity:     Worry: Not on file     Inability: Not on file     Transportation needs:     Medical: Not on file     Non-medical: Not on file   Tobacco Use     Smoking status: Never Smoker     Smokeless tobacco: Never Used   Substance and Sexual Activity     Alcohol use: Yes     Comment: very little     Drug use: No     Sexual activity: Yes     Partners: Male     Birth control/protection: Pill   Lifestyle     Physical activity:     Days per week: Not on file     Minutes per session: Not on file     Stress: Not on file   Relationships     Social connections:     Talks on phone: Not on file     Gets together: Not on file     Attends Sabianism service: Not on file     Active member of club or organization: Not on file     Attends meetings of clubs or organizations: Not on file     Relationship status: Not on file     Intimate partner violence:     Fear of current or ex partner: Not on file     Emotionally abused: Not on  "file     Physically abused: Not on file     Forced sexual activity: Not on file   Other Topics Concern     Parent/sibling w/ CABG, MI or angioplasty before 65F 55M? Not Asked   Social History Narrative     Not on file       Family History - History reviewed. No pertinent family history.    Review of Systems - As per HPI and PMHx, otherwise 7 system review of the head and neck negative. 10+ system review negative.    Physical Exam  /72 (BP Location: Left arm, Patient Position: Sitting, Cuff Size: Adult Regular)   Pulse 71   Temp 98.2  F (36.8  C) (Oral)   Ht 1.746 m (5' 8.75\")   Wt 98.4 kg (217 lb)   BMI 32.28 kg/m    General - The patient is well nourished and well developed, and appears to have good nutritional status.  Alert and oriented to person and place, answers questions and cooperates with examination appropriately.   Head and Face - Normocephalic and atraumatic, with no gross asymmetry noted of the contour of the facial features.  The facial nerve is intact, with strong symmetric movements.  Voice and Breathing - The patient was breathing comfortably without the use of accessory muscles. There was no wheezing, stridor, or stertor.  The patients voice was clear and strong, and had appropriate pitch and quality.  Ears - Bilateral pinna and EACs with normal appearing overlying skin. Tympanic membrane intact with good mobility on pneumatic otoscopy bilaterally. Bony landmarks of the ossicular chain are normal. The tympanic membranes are normal in appearance. No retraction, perforation, or masses.  No fluid or purulence was seen in the external canal or the middle ear.   Eyes - Extraocular movements intact.  Sclera were not icteric or injected, conjunctiva were pink and moist.  Mouth - Examination of the oral cavity showed pink, healthy oral mucosa. No lesions or ulcerations noted.  The tongue was mobile and midline, and the dentition were in good condition.    Throat - The walls of the oropharynx " were smooth, pink, moist, symmetric, and had no lesions or ulcerations.  The tonsillar pillars and soft palate were symmetric.  The uvula was midline on elevation.  Neck - Normal midline excursion of the laryngotracheal complex during swallowing.  Full range of motion on passive movement.  Palpation of the occipital, submental, submandibular, internal jugular chain, and supraclavicular nodes did not demonstrate any abnormal lymph nodes or masses.  The carotid pulse was palpable bilaterally.  Palpation of the thyroid was soft and smooth, with no nodules or goiter appreciated.  The trachea was mobile and midline.  Nose - External contour is symmetric, no gross deflection or scars.  Nasal mucosa is pink and moist with no abnormal mucus.  The septum was midline and non-obstructive, turbinates of normal size and position.  No polyps, masses, or purulence noted on examination.       Audiogram 05/14/19 demonstrates a mild hearing loss that is symmetric and affects the mid frequencies only.        Assessment - Merlyn Markham is a 33 year old female with bilateral midfrequency SNHL. I discussed that this is a typical pattern for congenital hearing loss, and her history of making adjustments for her poor hearing suggests that this has been longstanding. 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 She is medically cleared for amplification and was encouraged to arrange a hearing aid consultation.       Dr. Jewel Lane MD  Otolaryngology  [facility]

## 2019-05-14 NOTE — LETTER
5/14/2019         RE: Merlyn Markham  [address] [address] [facility] [address]        Dear Colleague,    Thank you for referring your patient, Merlyn Markham, to the [facility]. Please see a copy of my visit note below.        History of Present Illness - Merlyn Markham is a 33 year old female who presents with concerns about her hearing. She has known for years that she has trouble hearing in groups. 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 She had several ear infections in childhood, but did not have any ear surgery. She denies vertigo.    Past Medical History -   No prior surgery.    Current Medications -   Current Outpatient Medications:      norethindrone (MICRONOR) 0.35 MG tablet, Take 0.35 mg by mouth daily, Disp: , Rfl: 3     sertraline (ZOLOFT) 50 MG tablet, Take 50 mg by mouth daily, Disp: , Rfl: 1    Allergies - No Known Allergies    Social History -   Social History     Socioeconomic History     Marital status: Single     Spouse name: Not on file     Number of children: Not on file     Years of education: Not on file     Highest education level: Not on file   Occupational History     Not on file   Social Needs     Financial resource strain: Not on file     Food insecurity:     Worry: Not on file     Inability: Not on file     Transportation needs:     Medical: Not on file     Non-medical: Not on file   Tobacco Use     Smoking status: Never Smoker     Smokeless tobacco: Never Used   Substance and Sexual Activity     Alcohol use: Yes     Comment: very little     Drug use: No     Sexual activity: Yes     Partners: Male     Birth control/protection: Pill   Lifestyle     Physical activity:     Days per week: Not on file     Minutes per session: Not on file     Stress: Not on file   Relationships     Social connections:     Talks on phone: Not on file     Gets together: Not on file     Attends Jew service: Not on file     Active member of club  "or organization: Not on file     Attends meetings of clubs or organizations: Not on file     Relationship status: Not on file     Intimate partner violence:     Fear of current or ex partner: Not on file     Emotionally abused: Not on file     Physically abused: Not on file     Forced sexual activity: Not on file   Other Topics Concern     Parent/sibling w/ CABG, MI or angioplasty before 65F 55M? Not Asked   Social History Narrative     Not on file       Family History - History reviewed. No pertinent family history.    Review of Systems - As per HPI and PMHx, otherwise 7 system review of the head and neck negative. 10+ system review negative.    Physical Exam  /72 (BP Location: Left arm, Patient Position: Sitting, Cuff Size: Adult Regular)   Pulse 71   Temp 98.2  F (36.8  C) (Oral)   Ht 1.746 m (5' 8.75\")   Wt 98.4 kg (217 lb)   BMI 32.28 kg/m     General - The patient is well nourished and well developed, and appears to have good nutritional status.  Alert and oriented to person and place, answers questions and cooperates with examination appropriately.   Head and Face - Normocephalic and atraumatic, with no gross asymmetry noted of the contour of the facial features.  The facial nerve is intact, with strong symmetric movements.  Voice and Breathing - The patient was breathing comfortably without the use of accessory muscles. There was no wheezing, stridor, or stertor.  The patients voice was clear and strong, and had appropriate pitch and quality.  Ears - Bilateral pinna and EACs with normal appearing overlying skin. Tympanic membrane intact with good mobility on pneumatic otoscopy bilaterally. Bony landmarks of the ossicular chain are normal. The tympanic membranes are normal in appearance. No retraction, perforation, or masses.  No fluid or purulence was seen in the external canal or the middle ear.   Eyes - Extraocular movements intact.  Sclera were not icteric or injected, conjunctiva were pink and " moist.  Mouth - Examination of the oral cavity showed pink, healthy oral mucosa. No lesions or ulcerations noted.  The tongue was mobile and midline, and the dentition were in good condition.    Throat - The walls of the oropharynx were smooth, pink, moist, symmetric, and had no lesions or ulcerations.  The tonsillar pillars and soft palate were symmetric.  The uvula was midline on elevation.  Neck - Normal midline excursion of the laryngotracheal complex during swallowing.  Full range of motion on passive movement.  Palpation of the occipital, submental, submandibular, internal jugular chain, and supraclavicular nodes did not demonstrate any abnormal lymph nodes or masses.  The carotid pulse was palpable bilaterally.  Palpation of the thyroid was soft and smooth, with no nodules or goiter appreciated.  The trachea was mobile and midline.  Nose - External contour is symmetric, no gross deflection or scars.  Nasal mucosa is pink and moist with no abnormal mucus.  The septum was midline and non-obstructive, turbinates of normal size and position.  No polyps, masses, or purulence noted on examination.       Audiogram 05/14/19 demonstrates a mild hearing loss that is symmetric and affects the mid frequencies only.        Assessment - Merlyn Markham is a 33 year old female with bilateral midfrequency SNHL. I discussed that this is a typical pattern for congenital hearing loss, and her history of making adjustments for her poor hearing suggests that this has been longstanding. 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 She is medically cleared for amplification and was encouraged to arrange a hearing aid consultation.       Dr. Jewel Lane MD  Otolaryngology  [facility]        Again, thank you for allowing me to participate in the care of your patient.        Sincerely,        Jewel Lane MD

## 2019-05-14 NOTE — PROGRESS NOTES
AUDIOLOGY REPORT    SUBJECTIVE:  Merlyn Markham is a 33 year old female who was seen in the Audiology Clinic at [facility] for an audiologic evaluation, referred by Dr. Lane.  No previous audiograms are available at today's appointment.  The patient reports she recently failed a hearing screening. She feels she has had some hearing loss since childhood. She reports a history of frequent ear infections. Patient reports difficulty hearing in the classroom, soft speech and the TV.  The patient denies bilateral otalgia, family history of hearing loss and history of noise exposure.     OBJECTIVE:  Abuse Screening:  Do you feel unsafe at home or work/school? No   Do you feel threatened by someone? No   Does anyone try to keep you from having contact with others, or doing things outside of your home? No   Physical signs of abuse present? No      Otoscopic exam indicates ears are clear of cerumen bilaterally       Pure Tone Thresholds assessed using conventional audiometry with good  reliability from 250-8000 Hz bilaterally using circumaural headphones     RIGHT:  normal and mild sensorineural hearing loss    LEFT:    normal and mild sensorineural hearing loss    Tympanogram:    RIGHT: normal eardrum mobility ,1000 Hz ipsi/contra reflexes present     LEFT:   normal eardrum mobility ,1000 Hz ipsi/contra reflexes present     Speech Reception Threshold:    RIGHT: 30 dB HL    LEFT:   25 dB HL  Word Recognition Score:     RIGHT: 84% at 70 dB HL using NU-6 recorded word list.    LEFT:   88% at 65 dB HL using NU-6 recorded word list.      ASSESSMENT:   Normal hearing through 500 Hz sloping to a mild rising to normal at 3000 Hz cookie bite sensorineural hearing loss bilaterally.     Today s results were discussed with the patient in detail.     PLAN: It is recommended that the patient be seen by Dr. Lane for medical evaluation of their ears and hearing evaluation. Patient was counseled regarding hearing loss  and impact on communication. Patient is a good candidate for amplification at this time.A [facility] information packet was given to the patient. Please call this clinic with questions regarding these results or recommendations.        Nithya Toth M.A. F-AAA  Clinical audiologist [place] # [id number]  5/14/2019

## 2019-06-14 ENCOUNTER — TELEPHONE (OUTPATIENT)
Dept: FAMILY MEDICINE | Facility: CLINIC | Age: 34
End: 2019-06-14

## 2019-06-14 NOTE — LETTER
[facility]  [address]  [place] [address]  [phone]  June 14, 2019    Merlyn Markham  [address]  [place] [address]    Dear Merlyn,    We care about your health and have reviewed your health plan including your medical conditions, medication list, and lab results.  Based on this review, it is recommended that you follow up regarding the following health topic(s):     -Cervical Cancer Screening    We recommend you take the following action(s):      _______  Colonoscopy  _______  Hypertension follow up with the nurse at no charge.  ___X__   pap .   _______  Asthma follow up     To provide the highest quality of care, we strongly encourage you to call and schedule  at your earliest convenience. The number to call and schedule is [phone] to schedule at one of the [place] locations.   We look forward to hearing from you.    Please call us at [phone] (or use [email or web address]) to address the above recommendations.     Thank you for trusting [facility] and we appreciate the opportunity to serve you.  We look forward to supporting your healthcare needs in the future.            Healthy Regards,      Inocencia William MD  Your Health Care Team  [facility]

## 2019-06-14 NOTE — TELEPHONE ENCOUNTER
Panel Management Review      Patient has the following on her problem list:   Depression / Dysthymia review    Measure:  Needs PHQ-9 score of 4 or less during index window.  Administer PHQ-9 and if score is 5 or more, send encounter to provider for next steps.    5 - 7 month window range:     No flowsheet data found.    If PHQ-9 recheck is 5 or more, route to provider for next steps.    Patient is due for:  None      Composite cancer screening  Chart review shows that this patient is due/due soon for the following Pap Smear  Summary:    Patient is due/failing the following:   PAP    Action needed:   Patient needs office visit for pap.    Type of outreach:    Sent letter.    Questions for provider review:    None                                                                                                                                    Jayashree Jean-Baptiste MA

## 2020-02-10 ENCOUNTER — OFFICE VISIT (OUTPATIENT)
Dept: LAB | Facility: SCHOOL | Age: 35
End: 2020-02-10
Payer: COMMERCIAL

## 2020-02-10 VITALS
OXYGEN SATURATION: 98 % | SYSTOLIC BLOOD PRESSURE: 110 MMHG | RESPIRATION RATE: 16 BRPM | TEMPERATURE: 98.4 F | HEART RATE: 72 BPM | DIASTOLIC BLOOD PRESSURE: 78 MMHG | BODY MASS INDEX: 33.92 KG/M2 | HEIGHT: 69 IN | WEIGHT: 229 LBS

## 2020-02-10 DIAGNOSIS — J06.9 VIRAL UPPER RESPIRATORY TRACT INFECTION: Primary | ICD-10-CM

## 2020-02-10 DIAGNOSIS — Z20.828 EXPOSURE TO INFLUENZA: Primary | ICD-10-CM

## 2020-02-10 DIAGNOSIS — R07.0 THROAT PAIN: ICD-10-CM

## 2020-02-10 LAB — S PYO AG THROAT QL IA.RAPID: NORMAL

## 2020-02-10 PROCEDURE — 87081 CULTURE SCREEN ONLY: CPT | Performed by: NURSE PRACTITIONER

## 2020-02-10 PROCEDURE — 99213 OFFICE O/P EST LOW 20 MIN: CPT

## 2020-02-10 PROCEDURE — 87880 STREP A ASSAY W/OPTIC: CPT

## 2020-02-10 RX ORDER — OSELTAMIVIR PHOSPHATE 75 MG/1
75 CAPSULE ORAL DAILY
Qty: 10 CAPSULE | Refills: 0 | Status: SHIPPED | OUTPATIENT
Start: 2020-02-10 | End: 2020-02-20

## 2020-02-10 ASSESSMENT — MIFFLIN-ST. JEOR: SCORE: 1803.12

## 2020-02-10 NOTE — PROGRESS NOTES
Merlyn Markham is a 34 year old female who presents to clinic today for the following health issues:    HPI   ENT Symptoms             Symptoms: cc Present Absent Comment   Fever/Chills   x    Fatigue  x     Muscle Aches   x    Eye Irritation   x    Sneezing  x     Nasal Malvin/Drg  x  Nasal congestion   Sinus Pressure/Pain   x    Loss of smell   x    Dental pain   x    Sore Throat  x     Swollen Glands  x     Ear Pain/Fullness  x     Cough   x    Wheeze   x    Chest Pain   x    Shortness of breath   x    Rash   x    Other   x      Symptom duration:  24 hours    Symptom severity:  moderate    Treatments tried:  none, is being put on tamilflu due to son having influenza A   Contacts:  5 year old son diagnosed with Influenza A and strep throat      Denies rashes, nausea and vomiting.  Eating and drinking fluids okay.    Patient Active Problem List   Diagnosis     Hypertriglyceridemia     Other acne     History reviewed. No pertinent surgical history.    Social History     Tobacco Use     Smoking status: Never Smoker     Smokeless tobacco: Never Used   Substance Use Topics     Alcohol use: Yes     Comment: very little     History reviewed. No pertinent family history.      Current Outpatient Medications   Medication Sig Dispense Refill     norethindrone (MICRONOR) 0.35 MG tablet Take 0.35 mg by mouth daily  3     sertraline (ZOLOFT) 50 MG tablet Take 50 mg by mouth daily  1     oseltamivir (TAMIFLU) 75 MG capsule Take 1 capsule (75 mg) by mouth daily for 10 days (Patient not taking: Reported on 2/10/2020) 10 capsule 0     Allergies   Allergen Reactions     Ragweeds        Reviewed and updated as needed this visit by Provider  Tobacco  Allergies  Meds  Problems  Med Hx  Surg Hx  Fam Hx         Review of Systems   ROS COMP: CONSTITUTIONAL:POSITIVE  for fatigue  ENT/MOUTH: POSITIVE for nasal congestion, sneezing, sore throat, swollen glands and ear pain and fullness  RESP: NEGATIVE for significant cough or SOB  CV:  "NEGATIVE for chest pain, palpitations or peripheral edema  PSYCHIATRIC: NEGATIVE for changes in mood or affect  ROS otherwise negative      Objective    /78   Pulse 72   Temp 98.4  F (36.9  C) (Tympanic)   Resp 16   Ht 1.753 m (5' 9\")   Wt 103.9 kg (229 lb)   SpO2 98%   BMI 33.82 kg/m    Body mass index is 33.82 kg/m .  Physical Exam   GENERAL: healthy, alert and no distress  HENT: ear canals and TM's normal, nose and mouth without ulcers or lesions  NECK: no adenopathy and no asymmetry, masses, or scars  RESP: lungs clear to auscultation - no rales, rhonchi or wheezes  CV: regular rate and rhythm, normal S1 S2, no S3 or S4, no murmur, click or rub, no peripheral edema and peripheral pulses strong  PSYCH: mentation appears normal, affect normal/bright    Diagnostic Test Results:  Results for orders placed or performed in visit on 02/10/20 (from the past 24 hour(s))   Rapid strep screen   Result Value Ref Range    Rapid Strep A Screen neg neg           Assessment & Plan     1. Viral upper respiratory tract infection  Rapid strep is negative.  Culture is pending and patient will be notified if positive for treatment.  Exam is benign for features of strep throat.  Discussed side effects of tamiflu since patient was not educated of these.  Also discussed that using tamiflu on the children would be okay due ages below 6 and more at high risk.  She can decide if she wants to use preventative tamiflu or not since she is healthy.  Patient was in a hurry and declined AVS.    2. Throat pain  - Rapid strep screen  - Beta strep group A culture     See Patient Instructions    Return in about 1 week (around 2/17/2020), or if symptoms worsen or fail to improve.     Tiana Jo NP on 2/10/2020 at 1:51 PM  [facility]       "

## 2020-02-10 NOTE — PROGRESS NOTES
RN Close Contact Influenza  ExposureTreatment  per provider   Verbal order     Merlyn Markham, a 34 year old female, has been exposed to a known positive influenza case.      Close :child    ASSESSMENT/PLAN:   1.  Antiviral  treatment is being ordered by  A Mandie CARIAS             And is  indicated as per close contact guidelines.    Tamiflu is an antiviral prescription medicine that attacks the actual flu virus at its source, and helps stop it from spreading in your body.  Tamiflu is a prescription medicine used to treat the flu (influenza) in people 2 weeks of age and older who have had flu symptoms for no more than 2-3  days. Tamiflu can also reduce the chance of getting the flu in people 1 year and older. Fever 102-103,chills,  Cough , body aches, fatigue,   Flu Prevention Dosing for   Adults and Teens 13 Years and Older  Treatment dosing for 5 days 75 mg twice daily   Prevention dosing for 10 days   75 mg once daily   If PEDS  Weight in kg --  Does pt take warfarin   no  Any kidney problems  / CKD   ?no    Sore throat  no  If yes  Must have strep test done      Patient must have NO symptoms of influenza -to get preventive     VO per FABI Lockwood NP    Rx sent [facility] dary Gallardo RN

## 2020-02-11 LAB
BACTERIA SPEC CULT: NORMAL
SPECIMEN SOURCE: NORMAL

## 2020-02-24 ENCOUNTER — HEALTH MAINTENANCE LETTER (OUTPATIENT)
Age: 35
End: 2020-02-24

## 2020-03-13 ENCOUNTER — OFFICE VISIT (OUTPATIENT)
Dept: LAB | Facility: SCHOOL | Age: 35
End: 2020-03-13
Payer: COMMERCIAL

## 2020-03-13 VITALS
TEMPERATURE: 97.7 F | OXYGEN SATURATION: 99 % | DIASTOLIC BLOOD PRESSURE: 76 MMHG | HEART RATE: 70 BPM | SYSTOLIC BLOOD PRESSURE: 114 MMHG | BODY MASS INDEX: 33.74 KG/M2 | HEIGHT: 69 IN | WEIGHT: 227.8 LBS

## 2020-03-13 DIAGNOSIS — Z20.818 EXPOSURE TO STREP THROAT: Primary | ICD-10-CM

## 2020-03-13 DIAGNOSIS — J02.9 ACUTE PHARYNGITIS, UNSPECIFIED ETIOLOGY: ICD-10-CM

## 2020-03-13 PROCEDURE — 99213 OFFICE O/P EST LOW 20 MIN: CPT

## 2020-03-13 RX ORDER — PENICILLIN V POTASSIUM 500 MG/1
500 TABLET, FILM COATED ORAL 2 TIMES DAILY
Qty: 20 TABLET | Refills: 0 | Status: SHIPPED | OUTPATIENT
Start: 2020-03-13 | End: 2020-03-23

## 2020-03-13 ASSESSMENT — MIFFLIN-ST. JEOR: SCORE: 1797.67

## 2020-03-13 NOTE — PATIENT INSTRUCTIONS
We will treat you with a course of antibiotics. Please complete the full course of antibiotics. Please take with food. Take a probiotic or eat a Greek yogurt daily while you are on the antibiotic. You will be contagious until you have completed 24 hours of the medication.  Please discard your toothbrush and replace with a new toothbrush to avoid reinfection.    Please use Tylenol 650mg every 4 hours or ibuprofen 600mg every 6 hours by mouth for pain/fever.  Do not exceed 4000mg of acetaminophen or 2400mg of ibuprofen from any source in a 24 hour period.  Taking Tylenol and ibuprofen together may be helpful in reducing pain.      May use salt water gargles and hot teas for comfort. Dissolve one teaspoon of salt in one cup of warm water to make a salt water gargle.    Watch for resolution of symptoms in the next few days. If you continue to have fevers, begin to have difficulty swallowing or breathing, notice neck pain or difficulty moving your neck, please return to clinic or present to the ER immediately.  Otherwise, follow up with your PCP as needed.

## 2020-03-13 NOTE — PROGRESS NOTES
Merlyn Markham is a 34 year old female who presents to clinic today for the following health issues:   HPI   ENT Symptoms    Symptoms: cc Present Absent Comment    Fever/Chills   x     Fatigue   x     Muscle Aches   x     Eye Irritation   x     Sneezing   x     Nasal Malvin/Drg   x     Sinus Pressure/Pain   x     Loss of smell   x     Dental pain   x     Sore Throat  x      Swollen Glands   x     Ear Pain/Fullness  x      Cough   x     Wheeze   x     Chest Pain   x     Shortness of breath   x     Rash   x     Other        Symptom duration: 3 days     Symptom severity: Slight sore throat     Treatments tried: None     Contacts: Son positive       HPI Reviewed: Patient states that her son was diagnosed with strep this morning. She reports only having a sore throat in clinic. Patient denies cough, rhinorrhea, SOB, chest pain, fever, chills, nausea, vomiting, or diarrhea.         Patient Active Problem List   Diagnosis     Hypertriglyceridemia     Other acne     History reviewed. No pertinent surgical history.    Social History     Tobacco Use     Smoking status: Never Smoker     Smokeless tobacco: Never Used   Substance Use Topics     Alcohol use: Yes     Comment: very little     Family History   Family history unknown: Yes         Current Outpatient Medications   Medication Sig Dispense Refill     norethindrone (MICRONOR) 0.35 MG tablet Take 0.35 mg by mouth daily  3     penicillin V (VEETID) 500 MG tablet Take 1 tablet (500 mg) by mouth 2 times daily for 10 days 20 tablet 0     sertraline (ZOLOFT) 50 MG tablet Take 50 mg by mouth daily  1     Allergies   Allergen Reactions     Ragweeds         Reviewed and updated as needed this visit by Provider   Review of Systems   ROS COMP: Constitutional, HEENT, cardiovascular, pulmonary, GI, , musculoskeletal, neuro, skin, endocrine and psych systems are negative, except as otherwise noted.   Objective   /76 (BP Location: Right arm, Patient Position: Sitting, Cuff Size:  "Adult Regular)  Pulse 70  Temp 97.7  F (36.5  C) (Tympanic)  Ht 1.753 m (5' 9\")  Wt 103.3 kg (227 lb 12.8 oz)  SpO2 99%  BMI 33.64 kg/m    Body mass index is 33.64 kg/m .   Physical Exam   GENERAL APPEARANCE: healthy, alert and no distress  EYES: Eyes grossly normal to inspection, PERRL and conjunctivae and sclerae normal  HENT: ear canals and TM's normal, nose and mouth without ulcers or lesions, oropharynx slight erythema and mild amount of post nasal drip noted   NECK: no adenopathy, no asymmetry, masses, or scars and thyroid normal to palpation  RESP: lungs clear to auscultation - no rales, rhonchi or wheezes  CV: regular rates and rhythm, normal S1 S2, no S3 or S4 and no murmur, click or rub  MS: extremities normal- no gross deformities noted  SKIN: no suspicious lesions or rashes  PSYCH: mentation appears normal and affect normal/bright     Diagnostic Test Results:  Labs reviewed in Epic  No results found for this or any previous visit (from the past 24 hour(s)).   Assessment & Plan     (Z20.818) Exposure to strep throat  (primary encounter diagnosis)  Comment: Given that patient's son tested positive for strep with empirically treat patient in same household with positive strep. Will treat with penicillin 500mg BID for 10 days. Recommend symptomatic care.    Plan: penicillin V (VEETID) 500 MG tablet      (J02.9) Acute pharyngitis, unspecified etiology  Comment: Given that patient's son tested positive for strep with empirically treat patient in same household with positive strep. Will treat with penicillin 500mg BID for 10 days. Recommend symptomatic care.  Plan: penicillin V (VEETID) 500 MG tablet         BMI:   Estimated body mass index is 33.64 kg/m  as calculated from the following:    Height as of this encounter: 1.753 m (5' 9\").    Weight as of this encounter: 103.3 kg (227 lb 12.8 oz).         See Patient Instructions  Patient Instructions   We will treat you with a course of antibiotics. Please " complete the full course of antibiotics. Please take with food. Take a probiotic or eat a Greek yogurt daily while you are on the antibiotic. You will be contagious until you have completed 24 hours of the medication.  Please discard your toothbrush and replace with a new toothbrush to avoid reinfection.    Please use Tylenol 650mg every 4 hours or ibuprofen 600mg every 6 hours by mouth for pain/fever.  Do not exceed 4000mg of acetaminophen or 2400mg of ibuprofen from any source in a 24 hour period.  Taking Tylenol and ibuprofen together may be helpful in reducing pain.      May use salt water gargles and hot teas for comfort. Dissolve one teaspoon of salt in one cup of warm water to make a salt water gargle.    Watch for resolution of symptoms in the next few days. If you continue to have fevers, begin to have difficulty swallowing or breathing, notice neck pain or difficulty moving your neck, please return to clinic or present to the ER immediately.  Otherwise, follow up with your PCP as needed.        Return in about 1 week (around 3/20/2020) for worsening or continued symptoms.    [facility] PROVIDER  [facility]

## 2020-03-13 NOTE — PROGRESS NOTES
"Khai Markham is a 34 year old female who presents to clinic today for the following health issues:    HPI   ENT Symptoms             Symptoms: cc Present Absent Comment   Fever/Chills   x    Fatigue   x    Muscle Aches   x    Eye Irritation   x    Sneezing   x    Nasal Malvin/Drg   x    Sinus Pressure/Pain   x    Loss of smell   x    Dental pain   x    Sore Throat  x     Swollen Glands   x    Ear Pain/Fullness  x     Cough   x    Wheeze   x    Chest Pain   x    Shortness of breath   x    Rash   x    Other         Symptom duration:  3 days   Symptom severity:     Treatments tried:     Contacts: Son positive strep today         Patient Active Problem List   Diagnosis     Hypertriglyceridemia     Other acne     History reviewed. No pertinent surgical history.    Social History     Tobacco Use     Smoking status: Never Smoker     Smokeless tobacco: Never Used   Substance Use Topics     Alcohol use: Yes     Comment: very little     Family History   Family history unknown: Yes         Current Outpatient Medications   Medication Sig Dispense Refill     norethindrone (MICRONOR) 0.35 MG tablet Take 0.35 mg by mouth daily  3     penicillin V (VEETID) 500 MG tablet Take 1 tablet (500 mg) by mouth 2 times daily for 10 days 20 tablet 0     sertraline (ZOLOFT) 50 MG tablet Take 50 mg by mouth daily  1         Reviewed and updated as needed this visit by Provider         Review of Systems   ROS COMP: Constitutional, HEENT, cardiovascular, pulmonary, gi and gu systems are negative, except as otherwise noted.      Objective    /76 (BP Location: Right arm, Patient Position: Sitting, Cuff Size: Adult Regular)   Pulse 70   Temp 97.7  F (36.5  C) (Tympanic)   Ht 1.753 m (5' 9\")   Wt 103.3 kg (227 lb 12.8 oz)   SpO2 99%   BMI 33.64 kg/m    Body mass index is 33.64 kg/m .  Physical Exam  Vitals signs and nursing note reviewed.   Constitutional:       Appearance: Normal appearance.   HENT:      Head: Normocephalic " "and atraumatic.      Right Ear: Tympanic membrane and ear canal normal.      Left Ear: Tympanic membrane and ear canal normal.      Nose: Nose normal. No rhinorrhea.      Right Sinus: No maxillary sinus tenderness or frontal sinus tenderness.      Left Sinus: No maxillary sinus tenderness or frontal sinus tenderness.      Mouth/Throat:      Lips: Pink.      Mouth: Mucous membranes are moist.      Pharynx: Oropharynx is clear. Posterior oropharyngeal erythema present.   Eyes:      General: Lids are normal.      Conjunctiva/sclera: Conjunctivae normal.      Comments: Non icteric   Neck:      Musculoskeletal: Neck supple.   Cardiovascular:      Rate and Rhythm: Normal rate and regular rhythm.      Pulses: Normal pulses.      Heart sounds: Normal heart sounds, S1 normal and S2 normal.   Pulmonary:      Effort: Pulmonary effort is normal.      Breath sounds: Normal breath sounds and air entry.   Lymphadenopathy:      Cervical: No cervical adenopathy.   Skin:     General: Skin is warm and dry.   Neurological:      General: No focal deficit present.      Mental Status: She is alert and oriented to person, place, and time.   Psychiatric:         Mood and Affect: Mood normal.         Behavior: Behavior normal.         Thought Content: Thought content normal.         Judgment: Judgment normal.            Diagnostic Test Results:  Labs reviewed in Epic  none         Assessment & Plan     1. Exposure to strep throat  Supportive care advised  - penicillin V (VEETID) 500 MG tablet; Take 1 tablet (500 mg) by mouth 2 times daily for 10 days  Dispense: 20 tablet; Refill: 0    2. Acute pharyngitis, unspecified etiology    - penicillin V (VEETID) 500 MG tablet; Take 1 tablet (500 mg) by mouth 2 times daily for 10 days  Dispense: 20 tablet; Refill: 0     BMI:   Estimated body mass index is 33.64 kg/m  as calculated from the following:    Height as of this encounter: 1.753 m (5' 9\").    Weight as of this encounter: 103.3 kg (227 lb 12.8 " oz).         Patient Instructions   We will treat you with a course of antibiotics. Please complete the full course of antibiotics. Please take with food. Take a probiotic or eat a Greek yogurt daily while you are on the antibiotic. You will be contagious until you have completed 24 hours of the medication.  Please discard your toothbrush and replace with a new toothbrush to avoid reinfection.    Please use Tylenol 650mg every 4 hours or ibuprofen 600mg every 6 hours by mouth for pain/fever.  Do not exceed 4000mg of acetaminophen or 2400mg of ibuprofen from any source in a 24 hour period.  Taking Tylenol and ibuprofen together may be helpful in reducing pain.      May use salt water gargles and hot teas for comfort. Dissolve one teaspoon of salt in one cup of warm water to make a salt water gargle.    Watch for resolution of symptoms in the next few days. If you continue to have fevers, begin to have difficulty swallowing or breathing, notice neck pain or difficulty moving your neck, please return to clinic or present to the ER immediately.  Otherwise, follow up with your PCP as needed.        Return in about 1 week (around 3/20/2020) for worsening or continued symptoms.  Marianela Dorado, [facility] PROVIDER  [facility]

## 2020-08-10 ENCOUNTER — OFFICE VISIT (OUTPATIENT)
Dept: FAMILY MEDICINE | Facility: CLINIC | Age: 35
End: 2020-08-10
Payer: COMMERCIAL

## 2020-08-10 VITALS
WEIGHT: 243.2 LBS | RESPIRATION RATE: 16 BRPM | SYSTOLIC BLOOD PRESSURE: 117 MMHG | DIASTOLIC BLOOD PRESSURE: 81 MMHG | HEART RATE: 68 BPM | TEMPERATURE: 96.2 F | BODY MASS INDEX: 35.91 KG/M2 | OXYGEN SATURATION: 98 %

## 2020-08-10 DIAGNOSIS — E66.01 MORBID OBESITY (H): ICD-10-CM

## 2020-08-10 DIAGNOSIS — E78.5 HYPERLIPIDEMIA LDL GOAL <160: Primary | ICD-10-CM

## 2020-08-10 PROCEDURE — 99213 OFFICE O/P EST LOW 20 MIN: CPT | Performed by: PHYSICIAN ASSISTANT

## 2020-08-10 RX ORDER — ATORVASTATIN CALCIUM 10 MG/1
10 TABLET, FILM COATED ORAL DAILY
Qty: 90 TABLET | Refills: 1 | Status: SHIPPED | OUTPATIENT
Start: 2020-08-10 | End: 2021-02-15

## 2020-08-10 NOTE — PROGRESS NOTES
Subjective     Merlyn Markham is a 35 year old female who presents to clinic today for the following health issues:    HPI     Follow-up on cholesterol results. Has never been on lipid medications, however has been told her LDL is high for years. Mom has been on atorvastatin since her 30s. Has attempted lifestyle modifications and having two young children has made this more difficult lately. Interested in starting medications.    Patient Active Problem List   Diagnosis     Hypertriglyceridemia     Other acne     Morbid obesity (H)     History reviewed. No pertinent surgical history.    Social History     Tobacco Use     Smoking status: Never Smoker     Smokeless tobacco: Never Used   Substance Use Topics     Alcohol use: Yes     Comment: very little     Family History   Problem Relation Age of Onset     Hyperlipidemia Mother      Diabetes Father      No Known Problems Brother      No Known Problems Sister          Current Outpatient Medications   Medication Sig Dispense Refill     atorvastatin (LIPITOR) 10 MG tablet Take 1 tablet (10 mg) by mouth daily 90 tablet 1     norethindrone (MICRONOR) 0.35 MG tablet Take 0.35 mg by mouth daily  3     sertraline (ZOLOFT) 50 MG tablet Take 50 mg by mouth daily  1     Allergies   Allergen Reactions     Ragweeds        Reviewed and updated as needed this visit by Provider    Review of Systems   Constitutional, HEENT, cardiovascular, pulmonary systems are negative, except as otherwise noted.      Objective    /81   Pulse 68   Temp 96.2  F (35.7  C) (Tympanic)   Resp 16   Wt 110.3 kg (243 lb 3.2 oz)   SpO2 98%   BMI 35.91 kg/m    Body mass index is 35.91 kg/m .  Physical Exam  Vitals signs and nursing note reviewed.   Constitutional:       General: She is not in acute distress.     Appearance: Normal appearance. She is not diaphoretic.   HENT:      Head: Normocephalic and atraumatic.      Nose: Nose normal.   Eyes:      Conjunctiva/sclera: Conjunctivae normal.  "  Pulmonary:      Effort: Pulmonary effort is normal. No respiratory distress.   Skin:     General: Skin is warm and dry.      Coloration: Skin is not jaundiced or pale.   Neurological:      General: No focal deficit present.      Mental Status: She is alert. Mental status is at baseline.   Psychiatric:         Mood and Affect: Mood normal.         Behavior: Behavior normal.          Diagnostic Test Results:  Labs reviewed in care everywhere:  Total cholesterol: 309, triglycerides 571, HDL 45, cholesterol/HDL ratio 6.87, non-HDL cholesterol 264,         Assessment & Plan   Impression is dyslipidemia and a 35-year-old overweight patient that has had she is interested in starting statin therapy difficulty with lifestyle modifications.  Family history of dyslipidemia.  I will start her on 10 mg of atorvastatin with fasting labs and primary care follow-up in 6 months to assess any changes.  Reinforced lifestyle modifications.    Complete history and physical exam as above. AF with normal VS.    DDx and Dx discussed with and explained to the pt to their satisfaction.  All questions were answered at this time. Pt expressed understanding of and agreement with this dx, tx, and plan. No further workup warranted and standard medication warnings given. I have given the patient a list of pertinent indications for re-evaluation. Will go to the Emergency Department if symptoms worsen or new concerning symptoms arise. Patient left in no apparent distress.       ICD-10-CM    1. Hyperlipidemia LDL goal <160  E78.5 atorvastatin (LIPITOR) 10 MG tablet     Lipid panel reflex to direct LDL Fasting     **Hepatic panel FUTURE 2mo   2. Morbid obesity (H)  E66.01         BMI:   Estimated body mass index is 35.91 kg/m  as calculated from the following:    Height as of 3/13/20: 1.753 m (5' 9\").    Weight as of this encounter: 110.3 kg (243 lb 3.2 oz).   Weight management plan: Discussed healthy diet and exercise guidelines    See " Patient Instructions    Return in about 6 months (around 2/10/2021).    JUNITO Contreras  [facility]

## 2020-08-10 NOTE — PATIENT INSTRUCTIONS
Rafael Zuleta,    Thank you for allowing [facility] to manage your care.    Call for a 8 hour fasting lab and clinic appointment in 6 months.    I sent your prescriptions to your pharmacy.    If you have any questions or concerns, please feel free to call us at [phone]    Sincerely,    Cem Carter PA-C    Did you know?  You can schedule an e-Visit for certain simple non-emergent issue for your convenience.  To learn more about or start an eVisit, simply login to [employer], click  Visits  on top banner, click  Start a Virtual Visit  drop down, and click  Symptom-Specific E-Visit     Patient Education     Lifestyle Changes to Control Cholesterol  You can control your cholesterol through diet, exercise, weight management, quitting smoking, stress management, and taking your medicines right. These things can also lower your risk for cardiovascular disease.  Eating healthy  Your healthcare provider will give you information on diet changes you may need to make. Your provider may recommend that you see a registered dietitian for help with diet changes. Changes may include:    Cutting back on the amount of fat and cholesterol in your meals    Eating less salt (sodium). This is especially important if you have high blood pressure.    Eating more fresh vegetables and fruits    Eating lean proteins such as fish, poultry, beans, and peas    Eating less red meat and processed meats    Using low-fat dairy products    Using vegetable and nut oils in limited amounts    Limiting how many sweets and processed foods like chips, cookies, and baked goods that you eat     Limiting how many sugar-sweetened beverages you drink    Limiting how often you eat out  Getting exercise  Regular exercise is a good way to help your body control cholesterol. Regular exercise can help in many ways. It can:    Raise your good cholesterol    Help lower your bad cholesterol    Let blood flow better through your body    Give more oxygen  to your muscles and tissues    Help you manage your weight    Help your heart pump better    Lower your blood pressure  Your healthcare provider may recommend that you get more physical activity if you haven't been active. Your provider may recommend that you get moderate to vigorous physical activity for at least 40 minutes each day. You should do this for at least 3 to 4 days each week. A few examples of moderate to vigorous activity are:    Walking at a brisk pace. This is about 3 to 4 miles per hour.    Jogging or running    Swimming or water aerobics    Hiking    Dancing    Martial arts    Tennis    Riding a bicycle or stationary bike    Dancing  Managing your weight  If you are overweight or obese, your healthcare provider will work with you to help you lose weight and lower your BMI (body mass index). Making diet changes and getting more physical activity can help. Changing your diet will help you lose weight more easily than adding exercise.  Quitting smoking  Smoking and other tobacco use can raise cholesterol and make it harder to control. Quitting is tough. But millions of people have given up tobacco for good. You can quit, too! Think about some of the reasons below to quit smoking. Do any of them make you think twice about your smoking habit?  Stop smoking because it:    Keeps your cholesterol high, even if you make all the other changes you re supposed to    Damages your body. It especially harms your heart, lungs, skin, and blood vessels.    Makes you more likely to have a heart attack (acute myocardial infarction), stroke, or cancer    Stains your teeth    Makes your skin, clothes, and breath smell bad    Costs a lot of money  Controlling stress   Learn ways to control stress. This will help you deal with stress in your home and work life. Controlling stress can greatly lower your risk of getting cardiovascular disease.  Making the most of medicines  Healthy eating and exercise are a good start  to keeping your cholesterol down. But you may need some extra help from medicine. If your doctor prescribes medicine, be sure to take it exactly as directed. Remember:    Tell your healthcare provider about all other medicines you take. This includes vitamins and herbs.    Tell your healthcare provider if you have any side effects after starting to take a medicine. Examples of side effects to watch for include muscle aches, weakness, blurred vision, rust-colored urine, yellowing of eyes or skin (jaundice), and headache.    Don t skip a dose or stop taking your medicine because you feel better or because your cholesterol numbers go down. Never stop taking your medicine unless your healthcare provider has told you it s OK.    Ask your healthcare provider if you have any questions about your medicines.  High risk groups  Some people may need to take medicines called statins to control their cholesterol. This is in addition to eating a healthy diet and getting regular exercise.  Statins can help you stay healthy. They can also help prevent a heart attack or stroke. You may need to take a statin if you are in one of these groups:    Adults who have had a heart attack or stroke. Or adults who have had peripheral vascular disease, a ministroke (transient ischemic attack), or stable or unstable angina. This group also includes people who have had a procedure to restore blood flow through a blocked artery. These procedures include percutaneous coronary intervention, angioplasty, stent, and open-heart bypass surgery.    Adults who have diabetes. Or adults who are at higher risk of having a heart attack or stroke and have an LDL cholesterol level of 70 to 189 mg/dL    Adults who are 21 years old or older and have an LDL cholesterol level of 190 mg/dL or higher.  If you are in a high-risk group, talk with your healthcare provider about your treatment goals. Make sure you understand why these goals are important, based on your  own health history and your family history of heart disease or high cholesterol.  Make a plan to have regular cholesterol checks. You may need to fast before getting this test. Also ask your provider about any side effects your medicines may cause. Let your provider know about any side effects you have. You may need to take more than one medicine to reach the cholesterol goals that you and your provider decide on.  Date Last Reviewed: 10/1/2016    3595-2724 [employer]. [address] [place] [address], [place] [id number]. All rights reserved. This information is not intended as a substitute for professional medical care. Always follow your healthcare professional's instructions.           Patient Education     Medicine for Cholesterol Control  Cholesterol is a waxy substance in your bloodstream. If there is too much of it in your blood, it can build up in the walls of your arteries. Over time, this buildup can lead to coronary disease. Coronary disease can put you at risk for a heart attack or stroke. It can also put you at risk for disease of the arteries in your legs and other places in your body. Medicine can give you the extra help you need to control your cholesterol.    How medicine helps  Different kinds of medicines help with cholesterol levels. Some help lower your LDL (bad cholesterol). Some help raise your HDL (good cholesterol). Other medicines lower your triglyceride levels. And some do all three. It may take some time to find the right medicine for you. Taking medicine will be only one part of your cholesterol control plan. You will still need to eat right and get regular exercise.  Talk with your healthcare provider to find out your risks for having a heart attack. Your provider can tell you what goals to use to see if your treatment is working. These goals may vary based on your health issues or family history. Also ask your provider how often your cholesterol should be checked as part of your  treatment plan. You may need to fast before getting your cholesterol checked.  Taking your medicine  It is important to:    Tell your healthcare provider about any other medicines you take. This includes over-the-counter medicines. It also includes vitamins and herbs.    Take your medicine exactly as directed. This helps make sure that it works as it should.    Don't skip a dose.    Don't stop taking it if you feel better.    Don't stop taking it when your cholesterol numbers improve.    Order your refill before your medicine runs out.  Side effects  Medicines can cause side effects. These often occur at the start of taking a new medicine. Side effects can include headache and upset stomach. Rarely you can have muscle aches. Tell your healthcare provider about any side effects you have.  When to call your healthcare provider  When taking your medicine, let your healthcare provider know if you have:    Yellowing of the whites of eyes    Blurred vision    Muscle aches    Trouble breathing   High-risk groups  Some people may need to take medicines called statins to control their cholesterol. This is in addition to eating a healthy diet and getting regular exercise.  Statins can help you stay healthy. They can also help prevent a heart attack or stroke. You may need to take a statin if you are in one of these groups:    Adults who have had a heart attack or stroke. Or adults who have had peripheral vascular disease, a ministroke (transient ischemic attack), or stable or unstable angina. This group also includes people who have had a procedure to restore blood flow through a blocked artery. These procedures include percutaneous coronary intervention, angioplasty, stent, and open-heart bypass surgery.    Adults who have diabetes. Or adults who are at higher risk of having a heart attack or stroke and have an LDL cholesterol level of 70 to 189 mg/dL    Adults who are 21 years old or older and have an LDL cholesterol level  of 190 mg/dL or higher.  If you are in a high-risk group, talk with your healthcare provider about your treatment goals. Make sure you understand why these goals are important, based on your own health history and your family history of heart disease or high cholesterol.  Make a plan to have regular cholesterol checks. You may need to fast before getting this test. Also ask your provider about any side effects your medicines may cause. Let your provider know about any side effects you have. You may need to take more than one medicine to reach the cholesterol goals that you and your provider decide on.  Date Last Reviewed: 10/1/2016    8361-9264 [employer]. [address] [place] [address], [place] [address]. All rights reserved. This information is not intended as a substitute for professional medical care. Always follow your healthcare professional's instructions.

## 2020-10-07 ENCOUNTER — NURSE TRIAGE (OUTPATIENT)
Dept: NURSING | Facility: CLINIC | Age: 35
End: 2020-10-07

## 2020-10-07 ENCOUNTER — VIRTUAL VISIT (OUTPATIENT)
Dept: FAMILY MEDICINE | Facility: CLINIC | Age: 35
End: 2020-10-07
Payer: COMMERCIAL

## 2020-10-07 DIAGNOSIS — R05.9 COUGH: Primary | ICD-10-CM

## 2020-10-07 PROCEDURE — 99213 OFFICE O/P EST LOW 20 MIN: CPT | Mod: TEL | Performed by: PHYSICIAN ASSISTANT

## 2020-10-07 RX ORDER — CODEINE PHOSPHATE AND GUAIFENESIN 10; 100 MG/5ML; MG/5ML
1-2 SOLUTION ORAL EVERY 4 HOURS PRN
Qty: 118 ML | Refills: 0 | Status: SHIPPED | OUTPATIENT
Start: 2020-10-07 | End: 2021-02-15

## 2020-10-07 NOTE — TELEPHONE ENCOUNTER
Calling about getting scheduled for covid test. Has an order in for a covid test. transferred call.  Kayce Morel RN, [facility]

## 2020-10-07 NOTE — TELEPHONE ENCOUNTER
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 [facility] is not working for the patient. Call transferred to scheduling for telephone visit. Patient is agreeable.     COVID 19 Nurse Triage Plan/Patient Instructions    Please be aware that novel coronavirus (COVID-19) may be circulating in the community. If you develop symptoms such as fever, cough, or SOB or if you have concerns about the presence of another infection including coronavirus (COVID-19), please contact your health care provider or visit [email or web address].     Disposition/Instructions    Virtual Visit with provider recommended. Reference Visit Selection Guide.    Thank you for taking steps to prevent the spread of this virus.  o Limit your contact with others.  o Wear a simple mask to cover your cough.  o Wash your hands well and often.    Resources    [facility]: About COVID-19: [email or web address]    CDC: What to Do If You're Sick: www.cdc.gov/coronavirus/2019-ncov/about/steps-when-sick.html    CDC: Ending Home Isolation: www.cdc.gov/coronavirus/2019-ncov/hcp/disposition-in-home-patients.html     CDC: Caring for Someone: www.cdc.gov/coronavirus/2019-ncov/if-you-are-sick/care-for-someone.html     [facility]: Interim Guidance for Hospital Discharge to Home: www.health.[facility].[place].us/diseases/coronavirus/hcp/hospdischarge.pdf    [facility] clinical trials (COVID-19 research studies): [email or web address]     Below are  the COVID-19 hotlines at the [place] Department of Health ([facility]). Interpreters are available.   o For health questions: Call 758-993-3196 or 1-805.123.9434 (7 a.m. to 7 p.m.)  o For questions about schools and childcare: Call 143-593-9461 or 1-200.678.2362 (7 a.m. to 7 p.m.)     Reason for Disposition    [1] COVID-19 infection suspected by caller or triager AND [2] mild symptoms (cough, fever, or others) AND [3] no complications or SOB    Additional Information    Negative: SEVERE difficulty breathing (e.g., struggling for each breath, speaks in single words)    Negative: Difficult to awaken or acting confused (e.g., disoriented, slurred speech)    Negative: Bluish (or gray) lips or face now    Negative: Shock suspected (e.g., cold/pale/clammy skin, too weak to stand, low BP, rapid pulse)    Negative: Sounds like a life-threatening emergency to the triager    Negative: [1] COVID-19 exposure AND [2] no symptoms    Negative: COVID-19 and Breastfeeding, questions about    Negative: [1] Adult with possible COVID-19 symptoms AND [2] triager concerned about severity of symptoms or other causes    Negative: SEVERE or constant chest pain or pressure (Exception: mild central chest pain, present only when coughing)    Negative: MODERATE difficulty breathing (e.g., speaks in phrases, SOB even at rest, pulse 100-120)    Negative: Patient sounds very sick or weak to the triager    Negative: MILD difficulty breathing (e.g., minimal/no SOB at rest, SOB with walking, pulse <100)    Negative: Chest pain or pressure    Negative: Fever > 103 F (39.4 C)    Negative: [1] Fever > 101 F (38.3 C) AND [2] age > 60    Negative: [1] Fever > 100.0 F (37.8 C) AND [2] bedridden (e.g., nursing home patient, CVA, chronic illness, recovering from surgery)    Negative: HIGH RISK patient (e.g., age > 64 years, diabetes, heart or lung disease, weak immune system) (Exception: Has already been evaluated by healthcare provider and has no new or  worsening symptoms)    Negative: Fever present > 3 days (72 hours)    Negative: [1] Fever returns after gone for over 24 hours AND [2] symptoms worse or not improved    Negative: [1] Continuous (nonstop) coughing interferes with work or school AND [2] no improvement using cough treatment per protocol    Protocols used: CORONAVIRUS (COVID-19) DIAGNOSED OR [id number] 8.4.20    Brook Walls RN on 10/7/2020 at 4:00 AM

## 2020-10-07 NOTE — PROGRESS NOTES
"Merlyn Markham is a 35 year old female who is being evaluated via a billable telephone visit.      The patient has been notified of following:     \"This telephone visit will be conducted via a call between you and your physician/provider. We have found that certain health care needs can be provided without the need for a physical exam.  This service lets us provide the care you need with a short phone conversation.  If a prescription is necessary we can send it directly to your pharmacy.  If lab work is needed we can place an order for that and you can then stop by our lab to have the test done at a later time.    Telephone visits are billed at different rates depending on your insurance coverage. During this emergency period, for some insurers they may be billed the same as an in-person visit.  Please reach out to your insurance provider with any questions.    If during the course of the call the physician/provider feels a telephone visit is not appropriate, you will not be charged for this service.\"    Patient has given verbal consent for Telephone visit?  Yes    What phone number would you like to be contacted at? [phone]    How would you like to obtain your AVS? Ye    Subjective     Merlyn Markham is a 35 year old female who presents via phone visit today for the following health issues:    HPI     Acute Illness  Acute illness concerns: URI sxs, possible Covid  Onset/Duration: Saturday evening, 4 days  Symptoms:  Fever: no  Chills/Sweats: no  Headache (location?): no  Sinus Pressure: no  Conjunctivitis:  YES-itching  Ear Pain: YES: both  Rhinorrhea: YES  Congestion: YES  Sore Throat: YES  Cough: YES-non-productive  Wheeze: no  Decreased Appetite: no  Nausea: no  Vomiting: no  Diarrhea: no  Dysuria/Freq.: no  Dysuria or Hematuria: no  Fatigue/Achiness: YES  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       Objective          Vitals:  No vitals were obtained today due to virtual visit.    healthy, alert and no distress  PSYCH: Alert and oriented times 3; coherent speech, normal   rate and volume, able to articulate logical thoughts, able   to abstract reason, no tangential thoughts, no hallucinations   or delusions  Her affect is normal and pleasant  RESP: No cough, no audible wheezing, able to talk in full sentences  Remainder of exam unable to be completed due to telephone visits        Assessment/Plan:    Assessment & Plan     1. Cough        Covid PCR ordered. Robitussin AC PRN cough. Supportive therapy also discussed. Follow up if symptoms fail to improve or worsen.           Return for a Covid PCR test.    Antonieta Bhakta PA-C  [facility]    Phone call duration:  7 minutes

## 2020-10-08 DIAGNOSIS — R05.9 COUGH: ICD-10-CM

## 2020-10-08 PROCEDURE — U0003 INFECTIOUS AGENT DETECTION BY NUCLEIC ACID (DNA OR RNA); SEVERE ACUTE RESPIRATORY SYNDROME CORONAVIRUS 2 (SARS-COV-2) (CORONAVIRUS DISEASE [COVID-19]), AMPLIFIED PROBE TECHNIQUE, MAKING USE OF HIGH THROUGHPUT TECHNOLOGIES AS DESCRIBED BY CMS-2020-01-R: HCPCS | Performed by: PHYSICIAN ASSISTANT

## 2020-10-09 LAB
SARS-COV-2 RNA SPEC QL NAA+PROBE: NOT DETECTED
SPECIMEN SOURCE: NORMAL

## 2020-12-13 ENCOUNTER — HEALTH MAINTENANCE LETTER (OUTPATIENT)
Age: 35
End: 2020-12-13

## 2021-01-22 ENCOUNTER — OFFICE VISIT (OUTPATIENT)
Dept: LAB | Facility: SCHOOL | Age: 36
End: 2021-01-22
Payer: COMMERCIAL

## 2021-01-22 VITALS
WEIGHT: 236.4 LBS | HEART RATE: 62 BPM | OXYGEN SATURATION: 99 % | BODY MASS INDEX: 35.01 KG/M2 | SYSTOLIC BLOOD PRESSURE: 116 MMHG | HEIGHT: 69 IN | TEMPERATURE: 97.6 F | DIASTOLIC BLOOD PRESSURE: 80 MMHG

## 2021-01-22 DIAGNOSIS — E78.5 HYPERLIPIDEMIA LDL GOAL <160: ICD-10-CM

## 2021-01-22 DIAGNOSIS — Z00.00 WELLNESS EXAMINATION: Primary | ICD-10-CM

## 2021-01-22 LAB
CHOLEST SERPL-MCNC: 193 MG/DL
GLUCOSE SERPL-MCNC: 86 MG/DL (ref 70–99)
HDLC SERPL-MCNC: 57 MG/DL
LDLC SERPL CALC-MCNC: 100 MG/DL
NONHDLC SERPL-MCNC: 136 MG/DL
TRIGL SERPL-MCNC: 179 MG/DL

## 2021-01-22 PROCEDURE — 82947 ASSAY GLUCOSE BLOOD QUANT: CPT | Performed by: NURSE PRACTITIONER

## 2021-01-22 PROCEDURE — 99213 OFFICE O/P EST LOW 20 MIN: CPT

## 2021-01-22 PROCEDURE — 36415 COLL VENOUS BLD VENIPUNCTURE: CPT | Performed by: PHYSICIAN ASSISTANT

## 2021-01-22 PROCEDURE — 80061 LIPID PANEL: CPT | Performed by: PHYSICIAN ASSISTANT

## 2021-01-22 ASSESSMENT — MIFFLIN-ST. JEOR: SCORE: 1823.74

## 2021-01-22 NOTE — PATIENT INSTRUCTIONS
"Merlyn Markham has completed a Wellness Check at the [facility] on 1/22/2021.      ____________________________________________  [employer]CHO[employer] PROVIDER                                                                               Wellness Visit Recommendations:      See your regular primary care health provider every year in order to help stay healthy.    Review health changes.     Review your medicines if your doctor has prescribed any.    Talk to your provider about how often to have your cholesterol checked.    If you are at risk for diabetes, you should have a diabetes test (fasting glucose).    Shots: Get a flu shot each year. Get a tetanus shot every 10 years.     Review with your primary care provider other immunizations that you may need based on your age and/or medical/surgical history    Nutrition:     Eat at least 5 servings of fruits and vegetables each day.    Eat whole-grain bread, whole-wheat pasta and brown rice instead of white grains and rice.    Preventive Care to be reviewed by your primary care provider:    Females:        Cervical Cancer Screening                          Breast Cancer Screening                          Colon Cancer Screening    Lifestyle:    Exercise at least 150 minutes a week (30 minutes a day, 5 days of the week). This will help you control your weight and help prevent disease or manage disease.    Limit alcohol to one drink per day or less depending on your past medical history.    No smoking.     Wear sunscreen to prevent skin cancer.    See your dentist every six months for an exam and cleaning.    Today's Vital Signs:  /80 (BP Location: Right arm, Patient Position: Sitting, Cuff Size: Adult Regular)   Pulse 62   Temp 97.6  F (36.4  C) (Tympanic)   Ht 1.74 m (5' 8.5\")   Wt 107.2 kg (236 lb 6.4 oz)   SpO2 99%   BMI 35.42 kg/m    "

## 2021-01-22 NOTE — PROGRESS NOTES
"SUBJECTIVE:  CC: Merlyn Markham is an 35 year old woman who presents for Wellness Check at [id number].     Review of Healthy Lifestyle:    Do you get at least three servings of calcium containing foods daily (dairy, green leafy vegetables, etc.)? yes     Do you have a high-fiber diet? yes     Amount of exercise or daily activities, outside of work: none, walks a couple times a week    Do you wear sunscreen on a regular basis? Yes      Are you taking your medications regularly Yes     Have you had an eye exam in the past two years? yes    Do you see a dentist twice per year? yes    Do you have sleep apnea, excessive snoring or excessive daytime drowsiness? no    Do you use tobacco in any form? no       OBJECTIVE:    Vitals: /80 (BP Location: Right arm, Patient Position: Sitting, Cuff Size: Adult Regular)   Pulse 62   Temp 97.6  F (36.4  C) (Tympanic)   Ht 1.74 m (5' 8.5\")   Wt 107.2 kg (236 lb 6.4 oz)   SpO2 99%   BMI 35.42 kg/m    BMI= Body mass index is 35.42 kg/m .    HEARING: :  Testing not done; parent declined    VISION:  Right eye:  declined  Left eye:     declined  Both eyes: declined  Corrective lenses?  yes    Medication Reconciliation: complete    ASSESSMENT/PLAN:    Flu: done this season  Tdap: 2018  Labs: Fasting, labs done today (lipid order released from previous provider future order per patient request)    COUNSELING:      reports that she has never smoked. She has never used smokeless tobacco.    Estimated body mass index is 35.42 kg/m  as calculated from the following:    Height as of this encounter: 1.74 m (5' 8.5\").    Weight as of this encounter: 107.2 kg (236 lb 6.4 oz).   Weight management plan: Discussed healthy diet and exercise guidelines    Counseling Resources  [employer]'s [employer]  [email or web address]    [employer]CHO[employer] PROVIDER   HEALTH [facility]     "

## 2021-02-04 DIAGNOSIS — E78.5 HYPERLIPIDEMIA LDL GOAL <160: ICD-10-CM

## 2021-02-06 RX ORDER — ATORVASTATIN CALCIUM 10 MG/1
10 TABLET, FILM COATED ORAL DAILY
Qty: 90 TABLET | Refills: 1 | OUTPATIENT
Start: 2021-02-06

## 2021-02-11 ENCOUNTER — MYC MEDICAL ADVICE (OUTPATIENT)
Dept: FAMILY MEDICINE | Facility: CLINIC | Age: 36
End: 2021-02-11

## 2021-02-12 ENCOUNTER — NURSE TRIAGE (OUTPATIENT)
Dept: NURSING | Facility: CLINIC | Age: 36
End: 2021-02-12

## 2021-02-12 DIAGNOSIS — E78.5 HYPERLIPIDEMIA LDL GOAL <160: ICD-10-CM

## 2021-02-12 RX ORDER — ATORVASTATIN CALCIUM 10 MG/1
10 TABLET, FILM COATED ORAL DAILY
Qty: 90 TABLET | Refills: 1 | Status: CANCELLED
Start: 2021-02-12

## 2021-02-13 NOTE — TELEPHONE ENCOUNTER
Patient reports she has been out of Lipitor for days. She initially had the medication prescribed by JUNITO Renee back in August of 2020. This RN did try to refill the medication, but it failed the protocol.     Patient states she sent a Re[employer] message to the clinic asking if JUNITO Renee could refill the Lipitor.     The clinic responded and stated that Cem Carter is a same day provider, and does not establish care with patient's. The advised patient to send refill to patient's PCP. Patient states she doesn't have a PCP. Patient did states she had a wellness exam with Hannah Ness CNP in January of 2021, but the exam was at [facility] . 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     Patient is unsure what to do from here.     Advised patient that she needs to establish care with a PCP, and go to that PCP for refills from now on. Did warm transfer patient to scheduling for an establishment of care/med refill appointment.     Em Hobson RN/[facility]

## 2021-02-13 NOTE — TELEPHONE ENCOUNTER
"  Reason for Disposition    Caller requesting a NON-URGENT new prescription or refill and triager unable to refill per unit policy    Additional Information    Negative: Drug overdose and nurse unable to answer question    Negative: Caller requesting information not related to medicine    Negative: Caller requesting a prescription for Strep throat and has a positive culture result    Negative: Rash while taking a medication or within 3 days of stopping it    Negative: Immunization reaction suspected    Negative: [1] Asthma AND [2] having symptoms of asthma (cough, wheezing, etc)    Negative: MORE THAN A DOUBLE DOSE of a prescription or over-the-counter (OTC) drug    Negative: [1] DOUBLE DOSE (an extra dose or lesser amount) of over-the-counter (OTC) drug AND [2] any symptoms (e.g., dizziness, nausea, pain, sleepiness)    Negative: [1] DOUBLE DOSE (an extra dose or lesser amount) of prescription drug AND [2] any symptoms (e.g., dizziness, nausea, pain, sleepiness)    Negative: Took another person's prescription drug    Negative: [1] DOUBLE DOSE (an extra dose or lesser amount) of prescription drug AND [2] NO symptoms (Exception: a double dose of antibiotics)    Negative: Diabetes drug error or overdose (e.g., insulin or extra dose)    Negative: [1] Request for URGENT new prescription or refill of \"essential\" medication (i.e., likelihood of harm to patient if not taken) AND [2] triager unable to fill per unit policy    Negative: [1] Prescription not at pharmacy AND [2] was prescribed today by PCP    Negative: Pharmacy calling with prescription questions and triager unable to answer question    Negative: Caller has urgent medication question about med that PCP prescribed and triager unable to answer question    Negative: Caller has NON-URGENT medication question about med that PCP prescribed and triager unable to answer question    Protocols used: MEDICATION QUESTION CALL-A-AH    "

## 2021-02-15 ENCOUNTER — VIRTUAL VISIT (OUTPATIENT)
Dept: FAMILY MEDICINE | Facility: CLINIC | Age: 36
End: 2021-02-15
Payer: COMMERCIAL

## 2021-02-15 DIAGNOSIS — E78.5 HYPERLIPIDEMIA LDL GOAL <130: ICD-10-CM

## 2021-02-15 PROCEDURE — 99213 OFFICE O/P EST LOW 20 MIN: CPT | Mod: TEL | Performed by: FAMILY MEDICINE

## 2021-02-15 RX ORDER — ATORVASTATIN CALCIUM 10 MG/1
10 TABLET, FILM COATED ORAL DAILY
Qty: 90 TABLET | Refills: 3 | Status: SHIPPED | OUTPATIENT
Start: 2021-02-15 | End: 2022-02-11

## 2021-02-15 NOTE — PROGRESS NOTES
Merlyn is a 35 year old who is being evaluated via a billable telephone visit.      What phone number would you like to be contacted at? [phone]  How would you like to obtain your AVS? MyChart    Assessment & Plan     Hyperlipidemia LDL goal <130, controlled  - Refill: atorvastatin (LIPITOR) 10 MG tablet  Dispense: 90 tablet; Refill: 3      Return in about 1 year (around 2/15/2022) for Physical Exam and as needed throughout the year.    Madelin Burrell MD  [facility] ANI Zuleta is a 35 year old who presents for the following health issues     HPI       Hyperlipidemia Follow-Up  Currently on Atorvastatin 10 mg/day- tolerating well. No side effects reported.   Requesting a refill.  Expressing frustration that previous providers seen could not refill her meds when she recently had labs completed.   Admits that she did not have a PCP and is requesting me to be her PCP for medication management.       Are you regularly taking any medication or supplement to lower your cholesterol?   Yes- atorvastatin 10 mg    Are you having muscle aches or other side effects that you think could be caused by your cholesterol lowering medication?  No      How many servings of fruits and vegetables do you eat daily?  2-3    On average, how many sweetened beverages do you drink each day (Examples: soda, juice, sweet tea, etc.  Do NOT count diet or artificially sweetened beverages)?   0    How many days per week do you exercise enough to make your heart beat faster? 3 or less    How many minutes a day do you exercise enough to make your heart beat faster? 30 - 60    How many days per week do you miss taking your medication? 0    Recent lipid panel-    Recent Labs   Lab Test 01/22/21  1100 04/12/19  0720   CHOL 193 222*   HDL 57 37*   * 129*   TRIG 179* 279*         Review of Systems   Constitutional, HEENT, cardiovascular, pulmonary, gi and gu systems are negative, except as otherwise noted.       Objective           Vitals:  No vitals were obtained today due to virtual visit.    Physical Exam   healthy, alert and no distress  PSYCH: Alert and oriented times 3; coherent speech, normal   rate and volume, able to articulate logical thoughts, able   to abstract reason, no tangential thoughts, no hallucinations   or delusions  Her affect is normal  RESP: No cough, no audible wheezing, able to talk in full sentences  Remainder of exam unable to be completed due to telephone visits        Phone call duration: 8 minutes

## 2021-02-15 NOTE — TELEPHONE ENCOUNTER
Patient has an appointment with Dr Burrell this morning to establish care for her refills. Closing encounter. See RN triage note as well.    Mikaela Burch RN

## 2021-04-17 ENCOUNTER — HEALTH MAINTENANCE LETTER (OUTPATIENT)
Age: 36
End: 2021-04-17

## 2021-09-26 ENCOUNTER — HEALTH MAINTENANCE LETTER (OUTPATIENT)
Age: 36
End: 2021-09-26

## 2022-02-10 DIAGNOSIS — E78.5 HYPERLIPIDEMIA LDL GOAL <130: ICD-10-CM

## 2022-02-11 NOTE — TELEPHONE ENCOUNTER
Patient due for appointment and fasting labs.  Was going to refill #30 with appointment reminder.  Pop warning so will send to provider to advise on refill    Last seen by Dr Burrell virtually on 2-15-21

## 2022-02-12 RX ORDER — ATORVASTATIN CALCIUM 10 MG/1
10 TABLET, FILM COATED ORAL DAILY
Qty: 30 TABLET | Refills: 0 | Status: SHIPPED | OUTPATIENT
Start: 2022-02-12 | End: 2022-03-22

## 2022-03-19 DIAGNOSIS — E78.5 HYPERLIPIDEMIA LDL GOAL <130: ICD-10-CM

## 2022-03-22 NOTE — TELEPHONE ENCOUNTER
"Routing refill request to provider for review/approval because:  Labs not current:  ldl  Patient needs to be seen because it has been more than 1 year since last office visit.  2/15/21    Medication pended for approval, 30 day supply with reminder.     Requested Prescriptions   Pending Prescriptions Disp Refills     atorvastatin (LIPITOR) 10 MG tablet [Pharmacy Med Name: ATORVASTATIN 10 MG TABLET] 30 tablet 0     Sig: TAKE 1 TABLET (10 MG) BY MOUTH DAILY DUE FOR FASTING/PHYSICAL APPOINTMENT FOR FURTHER REFILLS       Statins Protocol Failed - 3/19/2022  9:33 AM        Failed - LDL on file in past 12 months     Recent Labs   Lab Test 01/22/21  1100   *             Failed - Recent (12 mo) or future (30 days) visit within the authorizing provider's specialty     Patient has had an office visit with the authorizing provider or a provider within the authorizing providers department within the previous 12 mos or has a future within next 30 days. See \"Patient Info\" tab in inbasket, or \"Choose Columns\" in Meds & Orders section of the refill encounter.              Passed - No abnormal creatine kinase in past 12 months     No lab results found.             Passed - Medication is active on med list        Passed - Patient is age 18 or older        Passed - No active pregnancy on record        Passed - No positive pregnancy test in past 12 months                 "

## 2022-03-23 RX ORDER — ATORVASTATIN CALCIUM 10 MG/1
10 TABLET, FILM COATED ORAL DAILY
Qty: 30 TABLET | Refills: 0 | Status: SHIPPED | OUTPATIENT
Start: 2022-03-23 | End: 2022-05-09

## 2022-05-08 ENCOUNTER — HEALTH MAINTENANCE LETTER (OUTPATIENT)
Age: 37
End: 2022-05-08

## 2022-05-09 ENCOUNTER — OFFICE VISIT (OUTPATIENT)
Dept: LAB | Facility: SCHOOL | Age: 37
End: 2022-05-09
Payer: COMMERCIAL

## 2022-05-09 VITALS
WEIGHT: 254 LBS | HEIGHT: 69 IN | HEART RATE: 67 BPM | DIASTOLIC BLOOD PRESSURE: 72 MMHG | RESPIRATION RATE: 18 BRPM | BODY MASS INDEX: 37.62 KG/M2 | SYSTOLIC BLOOD PRESSURE: 118 MMHG | TEMPERATURE: 97.7 F | OXYGEN SATURATION: 98 %

## 2022-05-09 DIAGNOSIS — E78.5 HYPERLIPIDEMIA LDL GOAL <130: ICD-10-CM

## 2022-05-09 DIAGNOSIS — Z00.00 WELLNESS EXAMINATION: ICD-10-CM

## 2022-05-09 DIAGNOSIS — Z00.8 ENCOUNTER FOR BIOMETRIC SCREENING: Primary | ICD-10-CM

## 2022-05-09 LAB
CHOLEST SERPL-MCNC: 192 MG/DL
FASTING STATUS PATIENT QL REPORTED: YES
GLUCOSE BLD-MCNC: 84 MG/DL (ref 60–99)
HDLC SERPL-MCNC: 43 MG/DL
LDLC SERPL CALC-MCNC: 90 MG/DL
NONHDLC SERPL-MCNC: 149 MG/DL
TRIGL SERPL-MCNC: 295 MG/DL

## 2022-05-09 PROCEDURE — 36415 COLL VENOUS BLD VENIPUNCTURE: CPT

## 2022-05-09 PROCEDURE — 82947 ASSAY GLUCOSE BLOOD QUANT: CPT

## 2022-05-09 PROCEDURE — 99214 OFFICE O/P EST MOD 30 MIN: CPT

## 2022-05-09 PROCEDURE — 80061 LIPID PANEL: CPT | Performed by: NURSE PRACTITIONER

## 2022-05-09 RX ORDER — ATORVASTATIN CALCIUM 10 MG/1
10 TABLET, FILM COATED ORAL DAILY
Qty: 30 TABLET | Refills: 0 | Status: SHIPPED | OUTPATIENT
Start: 2022-05-09 | End: 2022-05-10

## 2022-05-09 ASSESSMENT — PAIN SCALES - GENERAL: PAINLEVEL: NO PAIN (0)

## 2022-05-09 NOTE — PROGRESS NOTES
"  SUBJECTIVE:  CC: Merlyn is a 36 year old who presents for Wellness Check at [facility].     Review of Healthy Lifestyle:    Do you get at least three servings of calcium containing foods daily (dairy, green leafy vegetables, etc.)? yes     Do you have a high-fiber diet? yes     Amount of exercise or daily activities, outside of work: 3 day(s) per week    Do you wear sunscreen on a regular basis? Yes      Are you taking your medications regularly Yes     Have you had an eye exam in the past two years? yes    Do you see a dentist twice per year? yes    Do you have sleep apnea, excessive snoring or excessive daytime drowsiness? no    Do you use tobacco in any form? no     OBJECTIVE:    Vitals: /72 (BP Location: Right arm, Patient Position: Sitting, Cuff Size: Adult Large)   Pulse 67   Temp 97.7  F (36.5  C) (Tympanic)   Resp 18   Ht 1.74 m (5' 8.5\")   Wt 115.2 kg (254 lb)   SpO2 98%   BMI 38.06 kg/m    BMI= Body mass index is 38.06 kg/m .    HEARING: :  Testing not done:  Sees Audiology    VISION:   Goes to [facility] and sees Jonathan Concepcion, was just seen recently.    Medication Reconciliation: complete    ASSESSMENT/PLAN:  Z00.8) Encounter for biometric screening  (primary encounter diagnosis)  Comment: Fasting lipid and glucose sent.  Plan: Glucose whole blood, Lipid Profile           (Z00.00) Wellness examination  Comment:  Patient is here today for wellness examination.  Patient was given information on recommendations for health, preventative care, diet and lifestyle changes for good health.  No referrals needed today.    (E78.5) Hyperlipidemia LDL goal <130  Comment:  Patient is out of her lipid medication.  I will refill for 1 month pending her lipid results and when they are back will refill out for 1 year if this is normal.    Addendum 5/10/22:  Total Cholesterol and LDL are within normal range and HDL is just mildly low.  Her triglycerides are " "elevated to 295. Refilled Atorvastatin for 1 year at the 10 mg and recommended use of Omega 3/Fish Oil 2 g twice daily and repeat Lipid check in 3 months with her primary care provider.    COUNSELING:      reports that she has never smoked. She has never used smokeless tobacco.    Estimated body mass index is 38.06 kg/m  as calculated from the following:    Height as of this encounter: 1.74 m (5' 8.5\").    Weight as of this encounter: 115.2 kg (254 lb).   Weight management plan: Discussed healthy diet and exercise guidelines    Tiana Jo NP on 5/9/2022 at 7:18 AM  [facility]     "

## 2022-05-09 NOTE — PATIENT INSTRUCTIONS
"                Merlyn Markham has completed a Wellness Check at the [facility] on 5/9/2022.      ____________________________________________  [employer] SCHOOL PROVIDER                                                                               Wellness Visit Recommendations:    See your regular primary care health provider every year in order to help stay healthy.  Review health changes.   Review your medicines if your doctor has prescribed any.  Talk to your provider about how often to have your cholesterol checked.  If you are at risk for diabetes, you should have a diabetes test (fasting glucose).  Shots: Get a flu shot each year. Get a tetanus shot every 10 years.   Review with your primary care provider other immunizations that you may need based on your age and/or medical/surgical history    Nutrition:   Eat at least 5 servings of fruits and vegetables each day.  Eat whole-grain bread, whole-wheat pasta and brown rice instead of white grains and rice.    Preventive Care to be reviewed by your primary care provider:    Females:        Cervical Cancer Screening                          Breast Cancer Screening                          Colon Cancer Screening  Males:             Prostrate Cancer Screening                          Colon Cancer Screening      Lifestyle:  Exercise at least 150 minutes a week (30 minutes a day, 5 days of the week). This will help you control your weight and help prevent disease or manage disease.  Limit alcohol to one drink per day or less depending on your past medical history.  No smoking.   Wear sunscreen to prevent skin cancer.  See your dentist every six months for an exam and cleaning.    Today's Vital Signs:  /72 (BP Location: Right arm, Patient Position: Sitting, Cuff Size: Adult Large)   Pulse 67   Temp 97.7  F (36.5  C) (Tympanic)   Resp 18   Ht 1.74 m (5' 8.5\")   Wt 115.2 kg (254 lb)   SpO2 98%   BMI 38.06 kg/m    "

## 2022-05-10 RX ORDER — ATORVASTATIN CALCIUM 10 MG/1
10 TABLET, FILM COATED ORAL DAILY
Qty: 90 TABLET | Refills: 3 | Status: SHIPPED | OUTPATIENT
Start: 2022-05-10 | End: 2023-06-07

## 2022-07-25 ENCOUNTER — TRANSFERRED RECORDS (OUTPATIENT)
Dept: MULTI SPECIALTY CLINIC | Facility: CLINIC | Age: 37
End: 2022-07-25

## 2022-07-25 LAB
HPV ABSTRACT: NORMAL
PAP-ABSTRACT: NORMAL

## 2023-01-14 ENCOUNTER — HEALTH MAINTENANCE LETTER (OUTPATIENT)
Age: 38
End: 2023-01-14

## 2023-01-30 ENCOUNTER — E-VISIT (OUTPATIENT)
Dept: URGENT CARE | Facility: URGENT CARE | Age: 38
End: 2023-01-30
Payer: COMMERCIAL

## 2023-01-30 DIAGNOSIS — H10.33 ACUTE BACTERIAL CONJUNCTIVITIS OF BOTH EYES: Primary | ICD-10-CM

## 2023-01-30 PROCEDURE — 99421 OL DIG E/M SVC 5-10 MIN: CPT | Performed by: PHYSICIAN ASSISTANT

## 2023-01-30 RX ORDER — CIPROFLOXACIN HYDROCHLORIDE 3.5 MG/ML
SOLUTION/ DROPS TOPICAL
Qty: 5 ML | Refills: 0 | Status: SHIPPED | OUTPATIENT
Start: 2023-01-30 | End: 2023-02-06

## 2023-01-30 NOTE — PATIENT INSTRUCTIONS
Thank you for choosing us for your care. I have placed an order for a prescription so that you can start treatment. View your full visit summary for details by clicking on the link below. Your pharmacist will able to address any questions you may have about the medication.     If you re not feeling better within 2-3 days, please schedule an appointment.  You can schedule an appointment right here in [facility], or call [phone]  If the visit is for the same symptoms as your eVisit, we ll refund the cost of your eVisit if seen within seven days.      Bacterial Conjunctivitis    You have an infection in the membranes covering the white part of the eye. This part of the eye is called the conjunctiva. The infection is called conjunctivitis. The most common symptoms of conjunctivitis include a thick, pus-like discharge from the eye, swollen eyelids, redness, eyelids sticking together upon awakening, and a gritty or scratchy feeling in the eye. Your infection was caused by bacteria. It may be treated with medicine. With treatment, the infection takes about 7 to 10 days to resolve.   Home care    Use prescribed antibiotic eye drops or ointment as directed to treat the infection.    Apply a warm compress (towel soaked in warm water) to the affected eye 3 to 4 times a day. Do this just before applying medicine to the eye.    Use a warm, wet cloth to wipe away crusting of the eyelids in the morning. This is caused by mucus drainage during the night. You may also use saline irrigating solution or artificial tears to rinse away mucus in the eye. Do not put a patch over the eye.    Wash your hands before and after touching the infected eye. This is to prevent spreading the infection to the other eye, and to other people. Don't share your towels or washcloths with others.    You may use acetaminophen or ibuprofen to control pain, unless another medicine was prescribed. Talk with your healthcare provider before using these  medicines if you have chronic liver or kidney disease. Also talk with your provider if you have ever had a stomach ulcer or digestive bleeding.    Don't wear contact lenses until your eyes have healed and all symptoms are gone.    Follow-up care  Follow up with your healthcare provider, or as advised.  When to seek medical advice  Call your healthcare provider right away if any of these occur:    Worsening vision    Increasing pain in the eye    Increasing swelling or redness of the eyelid    Redness spreading around the eye  [employer] last reviewed this educational content on 4/1/2020 2000-2021 [employer]. All rights reserved. This information is not intended as a substitute for professional medical care. Always follow your healthcare professional's instructions.

## 2023-02-11 ENCOUNTER — TELEPHONE (OUTPATIENT)
Dept: FAMILY MEDICINE | Facility: CLINIC | Age: 38
End: 2023-02-11
Payer: COMMERCIAL

## 2023-02-11 NOTE — TELEPHONE ENCOUNTER
COVID Positive/Requesting COVID treatment    Patient is positive for COVID and requesting treatment options.    Date of positive COVID test (PCR or at home)? 02/10/2023  Current COVID symptoms: sore throat and congestion or runny nose  Date COVID symptoms began: 02/09/2023    Message should be routed to clinic RN pool. Best phone number to use for call back: [phone]

## 2023-02-13 NOTE — TELEPHONE ENCOUNTER
"I did chat with the Covid 19 treatment team, patient's last encounter with \"PCP\" was virtual visit 2/15/21.   Says that still qualifies her as an established patient and can be considered for the RN protocol.    8/10/20 visit with same day provider was her last in person visit.   Appears she has seen OB outside [facility] more recently.    Will need to do med reconcile before starting.    Attempted to call patient at home/mobile number, no answer, left message on voicemail; patient was instructed to return call to [facility] at [phone].    Adrienne Leary RN  [facility]    "

## 2023-02-14 NOTE — TELEPHONE ENCOUNTER
Called [phone] (home). Did they answer the phone: No, left a message on voicemail to return call to the [facility] at [phone], and to ask for any available triage nurse.    Anna RN  Triage Nurse  [facility]: [facility]

## 2023-02-14 NOTE — TELEPHONE ENCOUNTER
I called and spoke to patient, she says her symptoms are resolving and she actually tested negative for covid today and advises we can stop calling.    Closing encounter.    Adrienne Leary RN  [facility]

## 2023-06-21 ENCOUNTER — OFFICE VISIT (OUTPATIENT)
Dept: FAMILY MEDICINE | Facility: CLINIC | Age: 38
End: 2023-06-21
Payer: COMMERCIAL

## 2023-06-21 VITALS
TEMPERATURE: 97.2 F | BODY MASS INDEX: 38.78 KG/M2 | DIASTOLIC BLOOD PRESSURE: 80 MMHG | RESPIRATION RATE: 16 BRPM | OXYGEN SATURATION: 98 % | HEART RATE: 96 BPM | SYSTOLIC BLOOD PRESSURE: 120 MMHG | WEIGHT: 258.8 LBS

## 2023-06-21 DIAGNOSIS — Z11.4 ENCOUNTER FOR SCREENING FOR HIV: ICD-10-CM

## 2023-06-21 DIAGNOSIS — R03.0 ELEVATED BLOOD PRESSURE READING WITHOUT DIAGNOSIS OF HYPERTENSION: ICD-10-CM

## 2023-06-21 DIAGNOSIS — Z11.59 NEED FOR HEPATITIS C SCREENING TEST: ICD-10-CM

## 2023-06-21 DIAGNOSIS — E66.01 MORBID OBESITY (H): ICD-10-CM

## 2023-06-21 DIAGNOSIS — E78.5 HYPERLIPIDEMIA LDL GOAL <100: Primary | ICD-10-CM

## 2023-06-21 LAB
ALBUMIN SERPL BCG-MCNC: 4.5 G/DL (ref 3.5–5.2)
ALP SERPL-CCNC: 76 U/L (ref 35–104)
ALT SERPL W P-5'-P-CCNC: 40 U/L (ref 0–50)
ANION GAP SERPL CALCULATED.3IONS-SCNC: 14 MMOL/L (ref 7–15)
AST SERPL W P-5'-P-CCNC: 40 U/L (ref 0–45)
BILIRUB SERPL-MCNC: 0.3 MG/DL
BUN SERPL-MCNC: 9.3 MG/DL (ref 6–20)
CALCIUM SERPL-MCNC: 9.6 MG/DL (ref 8.6–10)
CHLORIDE SERPL-SCNC: 103 MMOL/L (ref 98–107)
CHOLEST SERPL-MCNC: 232 MG/DL
CREAT SERPL-MCNC: 0.64 MG/DL (ref 0.51–0.95)
DEPRECATED HCO3 PLAS-SCNC: 23 MMOL/L (ref 22–29)
ERYTHROCYTE [DISTWIDTH] IN BLOOD BY AUTOMATED COUNT: 13 % (ref 10–15)
GFR SERPL CREATININE-BSD FRML MDRD: >90 ML/MIN/1.73M2
GLUCOSE SERPL-MCNC: 104 MG/DL (ref 70–99)
HCT VFR BLD AUTO: 40.4 % (ref 35–47)
HCV AB SERPL QL IA: NONREACTIVE
HDLC SERPL-MCNC: 41 MG/DL
HGB BLD-MCNC: 13.5 G/DL (ref 11.7–15.7)
HIV 1+2 AB+HIV1 P24 AG SERPL QL IA: NONREACTIVE
LDLC SERPL CALC-MCNC: 126 MG/DL
MCH RBC QN AUTO: 30.1 PG (ref 26.5–33)
MCHC RBC AUTO-ENTMCNC: 33.4 G/DL (ref 31.5–36.5)
MCV RBC AUTO: 90 FL (ref 78–100)
NONHDLC SERPL-MCNC: 191 MG/DL
PLATELET # BLD AUTO: 253 10E3/UL (ref 150–450)
POTASSIUM SERPL-SCNC: 4.2 MMOL/L (ref 3.4–5.3)
PROT SERPL-MCNC: 7.9 G/DL (ref 6.4–8.3)
RBC # BLD AUTO: 4.48 10E6/UL (ref 3.8–5.2)
SODIUM SERPL-SCNC: 140 MMOL/L (ref 136–145)
TRIGL SERPL-MCNC: 324 MG/DL
TSH SERPL DL<=0.005 MIU/L-ACNC: 1.71 UIU/ML (ref 0.3–4.2)
WBC # BLD AUTO: 6.8 10E3/UL (ref 4–11)

## 2023-06-21 PROCEDURE — 86803 HEPATITIS C AB TEST: CPT | Performed by: FAMILY MEDICINE

## 2023-06-21 PROCEDURE — 36415 COLL VENOUS BLD VENIPUNCTURE: CPT | Performed by: FAMILY MEDICINE

## 2023-06-21 PROCEDURE — 85027 COMPLETE CBC AUTOMATED: CPT | Performed by: FAMILY MEDICINE

## 2023-06-21 PROCEDURE — 80061 LIPID PANEL: CPT | Performed by: FAMILY MEDICINE

## 2023-06-21 PROCEDURE — 99214 OFFICE O/P EST MOD 30 MIN: CPT | Performed by: FAMILY MEDICINE

## 2023-06-21 PROCEDURE — 84443 ASSAY THYROID STIM HORMONE: CPT | Performed by: FAMILY MEDICINE

## 2023-06-21 PROCEDURE — 87389 HIV-1 AG W/HIV-1&-2 AB AG IA: CPT | Performed by: FAMILY MEDICINE

## 2023-06-21 PROCEDURE — 80053 COMPREHEN METABOLIC PANEL: CPT | Performed by: FAMILY MEDICINE

## 2023-06-21 RX ORDER — ATORVASTATIN CALCIUM 10 MG/1
10 TABLET, FILM COATED ORAL DAILY
Qty: 90 TABLET | Refills: 3 | Status: SHIPPED | OUTPATIENT
Start: 2023-06-21 | End: 2024-08-20

## 2023-06-21 ASSESSMENT — PAIN SCALES - GENERAL: PAINLEVEL: NO PAIN (0)

## 2023-06-21 NOTE — PROGRESS NOTES
Assessment & Plan     Hyperlipidemia LDL goal <100  - Comprehensive metabolic panel (BMP + Alb, Alk Phos, ALT, AST, Total. Bili, TP)  - Lipid panel reflex to direct LDL Fasting  - Refill: atorvastatin (LIPITOR) 10 MG tablet  Dispense: 90 tablet; Refill: 3    Elevated blood pressure reading without diagnosis of hypertension  - Repeat BP at the end of the visit was within goal. Continue to monitor.    Need for hepatitis C screening test  - REVIEW OF HEALTH MAINTENANCE PROTOCOL ORDERS  - Hepatitis C Screen Reflex to HCV RNA Quant and Genotype    Encounter for screening for HIV  - HIV Antigen Antibody Combo    Morbid obesity (H)  - CBC with platelets  - TSH with free T4 reflex  - Nutrition Referral  - Weight loss goals:   1. Prevent further weight gain  2.  Aim to lose at least 1- 2 lbs/week.  3.  Eat a well balanced heart healthy diet, cut out soda/sugary drinks and control portion sizes.   4. Avoid missing breakfast.  5. Exercise regularly; increase exercise gradually as tolerated to a goal of  30-45 minutes/5 days a week.    Re-evaluate in 3 months. Consider Weight loss medications then if indicated.       Return in about 3 months (around 9/21/2023) for Follow up- Weight loss.        Madelin Burrell MD  [facility] ANI Zuleta is a 38 year old, presenting for the following health issues:  Lipids (Labs and recheck)        6/21/2023     7:44 AM   Additional Questions   Roomed by Howard MENDOZA   Accompanied by ZENOBIA         6/21/2023     7:44 AM   Patient Reported Additional Medications   Patient reports taking the following new medications NA     History of Present Illness       Hyperlipidemia:  She presents for follow up of hyperlipidemia.  She is taking medication to lower cholesterol. She is not having myalgia or other side effects to statin medications.    She eats 2-3 servings of fruits and vegetables daily.She consumes 0 sweetened beverage(s) daily.She exercises with enough  effort to increase her heart rate 10 to 19 minutes per day.  She exercises with enough effort to increase her heart rate 3 or less days per week.   She is taking medications regularly.     HYPERLIPIDEMIA - Patient has a long history of significant Hyperlipidemia requiring medication for treatment with recent good control. Patient reports no problems or side effects with the medication.   Recent Labs   Lab Test 05/09/22  0720 01/22/21  1100   CHOL 192 193   HDL 43* 57   LDL 90 100*   TRIG 295* 179*     Due for lipid panel today.     Blood pressure is slightly elevated in the clinic today- no known history of hypertension.     Patient is interested in losing weight.   Expresses frustration with her weight loss journey and did not want to look at the scales.   Admits, she struggles with eating in between meals. Not exercising as much. Busy with school and family.      Wt Readings from Last 4 Encounters:   06/21/23 117.4 kg (258 lb 12.8 oz)   05/09/22 115.2 kg (254 lb)   01/22/21 107.2 kg (236 lb 6.4 oz)   08/10/20 110.3 kg (243 lb 3.2 oz)     Body mass index is 38.78 kg/m .      HEALTH CARE MAINTENANCE: Pap is up to date- 7/25/22- done at [facility]. Will abstract.       Review of Systems   Constitutional, HEENT, cardiovascular, pulmonary, gi and gu systems are negative, except as otherwise noted.      Objective    /80   Pulse 96   Temp 97.2  F (36.2  C) (Temporal)   Resp 16   Wt 117.4 kg (258 lb 12.8 oz)   LMP 06/11/2023 (Exact Date)   SpO2 98%   Breastfeeding No   BMI 38.78 kg/m    Body mass index is 38.78 kg/m .  Physical Exam   GENERAL: healthy, alert and no distress  RESP: lungs clear to auscultation - no rales, rhonchi or wheezes  CV: regular rate and rhythm, normal S1 S2, no S3 or S4, no murmur, click or rub, no peripheral edema and peripheral pulses strong  PSYCH: mentation appears normal, affect normal/bright    DATA  Labs drawn and in process.

## 2023-06-22 ENCOUNTER — ALLIED HEALTH/NURSE VISIT (OUTPATIENT)
Dept: FAMILY MEDICINE | Facility: CLINIC | Age: 38
End: 2023-06-22
Payer: COMMERCIAL

## 2023-06-22 DIAGNOSIS — Z23 ENCOUNTER FOR IMMUNIZATION: Primary | ICD-10-CM

## 2023-06-22 PROCEDURE — 90746 HEPB VACCINE 3 DOSE ADULT IM: CPT

## 2023-06-22 PROCEDURE — 90471 IMMUNIZATION ADMIN: CPT

## 2023-06-22 NOTE — PROGRESS NOTES
Prior to immunization administration, verified patients identity using patient s name and date of birth. Please see Immunization Activity for additional information.     Screening Questionnaire for Adult Immunization    Are you sick today?   No   Do you have allergies to medications, food, a vaccine component or latex?   No   Have you ever had a serious reaction after receiving a vaccination?   No   Do you have a long-term health problem with heart, lung, kidney, or metabolic disease (e.g., diabetes), asthma, a blood disorder, no spleen, complement component deficiency, a cochlear implant, or a spinal fluid leak?  Are you on long-term aspirin therapy?   No   Do you have cancer, leukemia, HIV/AIDS, or any other immune system problem?   No   Do you have a parent, brother, or sister with an immune system problem?   No   In the past 3 months, have you taken medications that affect  your immune system, such as prednisone, other steroids, or anticancer drugs; drugs for the treatment of rheumatoid arthritis, Crohn s disease, or psoriasis; or have you had radiation treatments?   No   Have you had a seizure, or a brain or other nervous system problem?   No   During the past year, have you received a transfusion of blood or blood    products, or been given immune (gamma) globulin or antiviral drug?   No   For women: Are you pregnant or is there a chance you could become       pregnant during the next month?   No   Have you received any vaccinations in the past 4 weeks?   No     Immunization questionnaire answers were all negative.    I have reviewed the following standing orders:   This patient is due and qualifies for the Hepatitis B vaccine.    Click here for Hepatitis B Standing Order    I have reviewed the vaccines inclusion and exclusion criteria; No concerns regarding eligibility.         Patient instructed to remain in clinic for 15 minutes afterwards, and to report any adverse reactions.     Screening performed by  Howard Liu, REJI on 6/22/2023 at 11:40 AM.

## 2023-08-22 ENCOUNTER — OFFICE VISIT (OUTPATIENT)
Dept: FAMILY MEDICINE | Facility: CLINIC | Age: 38
End: 2023-08-22
Payer: COMMERCIAL

## 2023-08-22 VITALS
DIASTOLIC BLOOD PRESSURE: 80 MMHG | RESPIRATION RATE: 14 BRPM | OXYGEN SATURATION: 98 % | HEART RATE: 89 BPM | HEIGHT: 70 IN | TEMPERATURE: 98 F | BODY MASS INDEX: 36.56 KG/M2 | WEIGHT: 255.4 LBS | SYSTOLIC BLOOD PRESSURE: 108 MMHG

## 2023-08-22 DIAGNOSIS — E66.01 MORBID OBESITY (H): ICD-10-CM

## 2023-08-22 DIAGNOSIS — Z00.00 ROUTINE GENERAL MEDICAL EXAMINATION AT A HEALTH CARE FACILITY: Primary | ICD-10-CM

## 2023-08-22 DIAGNOSIS — Z23 NEED FOR VACCINATION: ICD-10-CM

## 2023-08-22 DIAGNOSIS — F43.23 ADJUSTMENT DISORDER WITH MIXED ANXIETY AND DEPRESSED MOOD: ICD-10-CM

## 2023-08-22 PROCEDURE — 90746 HEPB VACCINE 3 DOSE ADULT IM: CPT | Performed by: FAMILY MEDICINE

## 2023-08-22 PROCEDURE — 99395 PREV VISIT EST AGE 18-39: CPT | Mod: 25 | Performed by: FAMILY MEDICINE

## 2023-08-22 PROCEDURE — 99213 OFFICE O/P EST LOW 20 MIN: CPT | Mod: 25 | Performed by: FAMILY MEDICINE

## 2023-08-22 PROCEDURE — 90471 IMMUNIZATION ADMIN: CPT | Performed by: FAMILY MEDICINE

## 2023-08-22 RX ORDER — SERTRALINE HYDROCHLORIDE 100 MG/1
100 TABLET, FILM COATED ORAL DAILY
Qty: 90 TABLET | Refills: 1 | Status: SHIPPED | OUTPATIENT
Start: 2023-08-22 | End: 2024-03-14

## 2023-08-22 RX ORDER — SERTRALINE HYDROCHLORIDE 100 MG/1
100 TABLET, FILM COATED ORAL DAILY
Qty: 90 TABLET | Refills: 1 | COMMUNITY
Start: 2023-08-22 | End: 2023-08-22

## 2023-08-22 ASSESSMENT — ENCOUNTER SYMPTOMS
HEMATURIA: 0
SHORTNESS OF BREATH: 0
CONSTIPATION: 0
DIZZINESS: 0
CHILLS: 0
DIARRHEA: 0
ARTHRALGIAS: 0
SORE THROAT: 0
NERVOUS/ANXIOUS: 0
FREQUENCY: 0
JOINT SWELLING: 0
PALPITATIONS: 0
NAUSEA: 0
MYALGIAS: 0
BREAST MASS: 0
ABDOMINAL PAIN: 0
HEARTBURN: 0
HEMATOCHEZIA: 0
EYE PAIN: 0
PARESTHESIAS: 0
COUGH: 0
WEAKNESS: 0
FEVER: 0
DYSURIA: 0
HEADACHES: 0

## 2023-08-22 ASSESSMENT — PAIN SCALES - GENERAL: PAINLEVEL: NO PAIN (0)

## 2023-08-22 ASSESSMENT — ANXIETY QUESTIONNAIRES
2. NOT BEING ABLE TO STOP OR CONTROL WORRYING: SEVERAL DAYS
IF YOU CHECKED OFF ANY PROBLEMS ON THIS QUESTIONNAIRE, HOW DIFFICULT HAVE THESE PROBLEMS MADE IT FOR YOU TO DO YOUR WORK, TAKE CARE OF THINGS AT HOME, OR GET ALONG WITH OTHER PEOPLE: NOT DIFFICULT AT ALL
GAD7 TOTAL SCORE: 5
1. FEELING NERVOUS, ANXIOUS, OR ON EDGE: SEVERAL DAYS
7. FEELING AFRAID AS IF SOMETHING AWFUL MIGHT HAPPEN: NOT AT ALL
GAD7 TOTAL SCORE: 5
6. BECOMING EASILY ANNOYED OR IRRITABLE: MORE THAN HALF THE DAYS
3. WORRYING TOO MUCH ABOUT DIFFERENT THINGS: SEVERAL DAYS
5. BEING SO RESTLESS THAT IT IS HARD TO SIT STILL: NOT AT ALL

## 2023-08-22 ASSESSMENT — PATIENT HEALTH QUESTIONNAIRE - PHQ9
SUM OF ALL RESPONSES TO PHQ QUESTIONS 1-9: 3
5. POOR APPETITE OR OVEREATING: NOT AT ALL

## 2023-08-22 NOTE — PROGRESS NOTES
SUBJECTIVE:   CC: Merlyn is an 38 year old who presents for preventive health visit.       2023     4:55 PM   Additional Questions   Roomed by Jolene   Accompanied by n/a       Healthy Habits:     Getting at least 3 servings of Calcium per day:  Yes    Bi-annual eye exam:  Yes    Dental care twice a year:  Yes    Sleep apnea or symptoms of sleep apnea:  None and Daytime drowsiness    Diet:  Regular (no restrictions)    Frequency of exercise:  1 day/week    Duration of exercise:  Less than 15 minutes    Taking medications regularly:  Yes    Medication side effects:  None    Additional concerns today:  No      Patient would still like to discuss the followin.) would like to discuss sertraline     States that her OBGYN provider was prescribing it but she would like to transition to have her meds prescribed by her PCP.   Currently on Sertraline 100 mg/day. Tolerating well. No side effects reported. Requesting a refill.           2023     4:52 PM   Last PHQ-9   1.  Little interest or pleasure in doing things 0   2.  Feeling down, depressed, or hopeless 1   3.  Trouble falling or staying asleep, or sleeping too much 1   4.  Feeling tired or having little energy 1   5.  Poor appetite or overeating 0   6.  Feeling bad about yourself 0   7.  Trouble concentrating 0   8.  Moving slowly or restless 0   Q9: Thoughts of better off dead/self-harm past 2 weeks 0   PHQ-9 Total Score 3   Difficulty at work, home, or with people Not difficult at all         2023     4:52 PM   CLARE-7    1. Feeling nervous, anxious, or on edge 1   2. Not being able to stop or control worrying 1   3. Worrying too much about different things 1   4. Trouble relaxing 0   5. Being so restless that it is hard to sit still 0   6. Becoming easily annoyed or irritable 2   7. Feeling afraid, as if something awful might happen 0   CLARE-7 Total Score 5   If you checked any problems, how difficult have they made it for you to do your work,  take care of things at home, or get along with other people? Not difficult at all     In the past two weeks have you had thoughts of suicide or self-harm?  No.    Do you have concerns about your personal safety or the safety of others?   No        Additional Concern-  Also interested in losing weight.     Current Weight -     Wt Readings from Last 4 Encounters:   23 115.8 kg (255 lb 6.4 oz)   23 117.4 kg (258 lb 12.8 oz)   22 115.2 kg (254 lb)   21 107.2 kg (236 lb 6.4 oz)     Wishes to consider a referral to the Weight Loss Clinic.      HEALTH CARE MAINTENANCE: Pap is up to date. Labs recently completed 2023. Due for Hep B vaccine.       Social History     Tobacco Use    Smoking status: Never     Passive exposure: Never    Smokeless tobacco: Never   Substance Use Topics    Alcohol use: Yes     Comment: very little             2023    11:45 AM   Alcohol Use   Prescreen: >3 drinks/day or >7 drinks/week? No     Reviewed orders with patient.  Reviewed health maintenance and updated orders accordingly - No  Labs reviewed in EPIC  Patient Active Problem List   Diagnosis    Hypertriglyceridemia    Other acne    Morbid obesity (H)     No past surgical history on file.    Social History     Tobacco Use    Smoking status: Never     Passive exposure: Never    Smokeless tobacco: Never   Substance Use Topics    Alcohol use: Yes     Comment: very little     Family History   Problem Relation Age of Onset    Hyperlipidemia Mother     Diabetes Father     No Known Problems Brother     No Known Problems Sister          Current Outpatient Medications   Medication Sig Dispense Refill    atorvastatin (LIPITOR) 10 MG tablet Take 1 tablet (10 mg) by mouth daily 90 tablet 3    sertraline (ZOLOFT) 100 MG tablet Take 1 tablet (100 mg) by mouth daily 90 tablet 1     Allergies   Allergen Reactions    Ragweeds        Breast Cancer Screenin/22/2023    11:45 AM   Breast CA Risk Assessment (FHS-7)   Do  "you have a family history of breast, colon, or ovarian cancer? No / Unknown         Patient under 40 years of age: Routine Mammogram Screening not recommended.   Pertinent mammograms are reviewed under the imaging tab.    History of abnormal Pap smear: NO - age 30-65 PAP every 5 years with negative HPV co-testing recommended     Reviewed and updated as needed this visit by clinical staff   Tobacco  Allergies  Meds              Reviewed and updated as needed this visit by Provider                     Review of Systems   Constitutional:  Negative for chills and fever.   HENT:  Negative for congestion, ear pain, hearing loss and sore throat.    Eyes:  Negative for pain and visual disturbance.   Respiratory:  Negative for cough and shortness of breath.    Cardiovascular:  Negative for chest pain, palpitations and peripheral edema.   Gastrointestinal:  Negative for abdominal pain, constipation, diarrhea, heartburn, hematochezia and nausea.   Breasts:  Negative for tenderness, breast mass and discharge.   Genitourinary:  Positive for vaginal discharge. Negative for dysuria, frequency, genital sores, hematuria, pelvic pain, urgency and vaginal bleeding.   Musculoskeletal:  Negative for arthralgias, joint swelling and myalgias.   Skin:  Negative for rash.   Neurological:  Negative for dizziness, weakness, headaches and paresthesias.   Psychiatric/Behavioral:  Negative for mood changes. The patient is not nervous/anxious.           OBJECTIVE:   /80   Pulse 89   Temp 98  F (36.7  C) (Temporal)   Resp 14   Ht 1.77 m (5' 9.69\")   Wt 115.8 kg (255 lb 6.4 oz)   LMP 08/07/2023 (Exact Date)   SpO2 98%   BMI 36.98 kg/m    Physical Exam  GENERAL: healthy, alert and no distress  EYES: Eyes grossly normal to inspection, PERRL and conjunctivae and sclerae normal  HENT: ear canals and TM's normal, nose and mouth without ulcers or lesions  NECK: no adenopathy, no asymmetry, masses, or scars and thyroid normal to " palpation  RESP: lungs clear to auscultation - no rales, rhonchi or wheezes  BREAST: normal without masses, tenderness or nipple discharge and no palpable axillary masses or adenopathy  CV: regular rate and rhythm, normal S1 S2, no S3 or S4, no murmur, click or rub, no peripheral edema and peripheral pulses strong  ABDOMEN: soft, nontender, no hepatosplenomegaly, no masses and bowel sounds normal  MS: no gross musculoskeletal defects noted, no edema  SKIN: no suspicious lesions or rashes  NEURO: Normal strength and tone, mentation intact and speech normal  PSYCH: mentation appears normal, affect normal/bright    Diagnostic Test Results:  Labs reviewed in Epic    ASSESSMENT/PLAN:   Merlyn was seen today for physical.    Diagnoses and all orders for this visit:    Routine general medical examination at a health care facility    Adjustment disorder with mixed anxiety and depressed mood, stable  -     PHQ-9/CLARE 7 completed, see below/Epic for details    -     Refill: sertraline (ZOLOFT) 100 MG tablet; Take 1 tablet (100 mg) by mouth daily  -     Adult Mental Health  Referral; Future    Need for vaccination  -     HEPATITIS B VACCINE ADULT 3 DOSE IM (ENGERIX-B/RECOMBIVAX HB)    Morbid obesity (H)  -     Adult Comprehensive Weight Management  Referral; Future  -     Weight management plan: Discussed healthy diet and exercise guidelines  Weight loss goals:   Prevent further weight gain   Aim to lose at least 1- 2 lbs/week.   Eat a well balanced heart healthy diet, cut out soda/sugary drinks and control portion sizes.   Avoid missing breakfast.  Exercise regularly; increase exercise gradually as tolerated to a goal of  30-45 minutes/5 days a week.        Patient has been advised of split billing requirements and indicates understanding: Yes      COUNSELING:  Reviewed preventive health counseling, as reflected in patient instructions       Regular exercise       Healthy diet/nutrition      BMI:   Estimated  "body mass index is 36.98 kg/m  as calculated from the following:    Height as of this encounter: 1.77 m (5' 9.69\").    Weight as of this encounter: 115.8 kg (255 lb 6.4 oz).   Weight management plan: Discussed healthy diet and exercise guidelines      She reports that she has never smoked. She has never been exposed to tobacco smoke. She has never used smokeless tobacco.    Follow up annually and as needed thoughout the year.      Madelin Burrell MD  [facility]  "

## 2023-11-12 ENCOUNTER — HOSPITAL ENCOUNTER (EMERGENCY)
Facility: CLINIC | Age: 38
Discharge: HOME | End: 2023-11-12
Attending: PHYSICIAN ASSISTANT | Admitting: PHYSICIAN ASSISTANT
Payer: COMMERCIAL

## 2023-11-12 VITALS
DIASTOLIC BLOOD PRESSURE: 95 MMHG | OXYGEN SATURATION: 98 % | SYSTOLIC BLOOD PRESSURE: 155 MMHG | TEMPERATURE: 97.5 F | HEART RATE: 96 BPM | RESPIRATION RATE: 18 BRPM

## 2023-11-12 DIAGNOSIS — R05.9 COUGH: ICD-10-CM

## 2023-11-12 DIAGNOSIS — J01.90 ACUTE SINUSITIS WITH SYMPTOMS > 10 DAYS: ICD-10-CM

## 2023-11-12 DIAGNOSIS — J98.01 ACUTE BRONCHOSPASM: ICD-10-CM

## 2023-11-12 PROCEDURE — 99214 OFFICE O/P EST MOD 30 MIN: CPT | Performed by: PHYSICIAN ASSISTANT

## 2023-11-12 PROCEDURE — G0463 HOSPITAL OUTPT CLINIC VISIT: HCPCS | Performed by: PHYSICIAN ASSISTANT

## 2023-11-12 RX ORDER — ALBUTEROL SULFATE 90 UG/1
2 AEROSOL, METERED RESPIRATORY (INHALATION) EVERY 6 HOURS PRN
Qty: 18 G | Refills: 0 | Status: SHIPPED | OUTPATIENT
Start: 2023-11-12 | End: 2024-04-19

## 2023-11-12 RX ORDER — PREDNISONE 20 MG/1
TABLET ORAL
Qty: 10 TABLET | Refills: 0 | Status: SHIPPED | OUTPATIENT
Start: 2023-11-12 | End: 2024-04-19

## 2023-11-12 ASSESSMENT — ENCOUNTER SYMPTOMS
WHEEZING: 1
FEVER: 0
SINUS PAIN: 1
SINUS PRESSURE: 1
COUGH: 1
SHORTNESS OF BREATH: 1
CONSTITUTIONAL NEGATIVE: 1

## 2023-11-12 NOTE — ED PROVIDER NOTES
History     Chief Complaint   Patient presents with    Cough    Nasal Congestion     HPI  Merlyn Markham is a 38 year old female who presents to Urgent Care with complaints of ongoing cough and nasal congestion for the past 10 days.  Patient states she has had worsening facial and sinus pain and pressure over the past couple days.  Her cough is productive.  Patient also complains of associated shortness of breath and wheezing.  Her  has been ill with similar symptoms and had negative strep and COVID testing and was ultimately prescribed prednisone and an inhaler.  Patient symptoms have been worsening since the onset.  Denies history of asthma or underlying lung disease.  Denies fevers, chills, nausea, vomiting, diarrhea, abdominal pain, or chest pain.      Allergies:  Allergies   Allergen Reactions    Ragweeds        Problem List:    Patient Active Problem List    Diagnosis Date Noted    Morbid obesity (H) 08/10/2020     Priority: Medium    Hypertriglyceridemia 08/12/2016     Priority: Medium    Other acne 08/08/2008     Priority: Medium        Past Medical History:    Past Medical History:   Diagnosis Date    Anxiety     Hyperlipidemia        Past Surgical History:    No past surgical history on file.    Family History:    Family History   Problem Relation Age of Onset    Hyperlipidemia Mother     Diabetes Father     No Known Problems Brother     No Known Problems Sister        Social History:  Marital Status:   [2]  Social History     Tobacco Use    Smoking status: Never     Passive exposure: Never    Smokeless tobacco: Never   Vaping Use    Vaping Use: Never used   Substance Use Topics    Alcohol use: Yes     Comment: very little    Drug use: No        Medications:    albuterol (PROAIR HFA/PROVENTIL HFA/VENTOLIN HFA) 108 (90 Base) MCG/ACT inhaler  amoxicillin-clavulanate (AUGMENTIN) 875-125 MG tablet  predniSONE (DELTASONE) 20 MG tablet  atorvastatin (LIPITOR) 10 MG tablet  sertraline (ZOLOFT)  100 MG tablet          Review of Systems   Constitutional: Negative.  Negative for fever.   HENT:  Positive for congestion, sinus pressure and sinus pain.    Respiratory:  Positive for cough, shortness of breath and wheezing.    All other systems reviewed and are negative.      Physical Exam   BP: (!) 155/95  Pulse: 96  Temp: 97.5  F (36.4  C)  Resp: 18  SpO2: 98 %      Physical Exam  Constitutional:       General: She is not in acute distress.     Appearance: Normal appearance. She is well-developed. She is not ill-appearing, toxic-appearing or diaphoretic.   HENT:      Head: Normocephalic and atraumatic.      Right Ear: Tympanic membrane, ear canal and external ear normal.      Left Ear: Tympanic membrane, ear canal and external ear normal.      Nose: Mucosal edema, congestion and rhinorrhea present.      Right Sinus: Maxillary sinus tenderness present.      Left Sinus: Maxillary sinus tenderness present.      Mouth/Throat:      Lips: Pink.      Mouth: Mucous membranes are moist.      Pharynx: Uvula midline. Posterior oropharyngeal erythema present. No pharyngeal swelling, oropharyngeal exudate or uvula swelling.      Tonsils: No tonsillar exudate or tonsillar abscesses.   Eyes:      Extraocular Movements: Extraocular movements intact.      Conjunctiva/sclera: Conjunctivae normal.      Pupils: Pupils are equal, round, and reactive to light.   Cardiovascular:      Rate and Rhythm: Normal rate and regular rhythm.      Heart sounds: Normal heart sounds.   Pulmonary:      Effort: Pulmonary effort is normal. No respiratory distress.      Breath sounds: No stridor. Wheezing present. No rhonchi or rales.      Comments: Diffuse expiratory wheezing throughout lung fields  Musculoskeletal:         General: Normal range of motion.      Cervical back: Full passive range of motion without pain and neck supple. No rigidity. Normal range of motion.   Lymphadenopathy:      Cervical: No cervical adenopathy.   Skin:     General:  Skin is warm and dry.   Neurological:      Mental Status: She is alert and oriented to person, place, and time.   Psychiatric:         Behavior: Behavior is cooperative.         ED Course                 Procedures      No results found for this or any previous visit (from the past 24 hour(s)).    Medications - No data to display    Assessments & Plan (with Medical Decision Making)     Pt is a 38 year old female who presents to Urgent Care with complaints of ongoing cough and nasal congestion for the past 10 days.  Patient states she has had worsening facial and sinus pain and pressure over the past couple days.  Her cough is productive.  Patient also complains of associated shortness of breath and wheezing.  Her  has been ill with similar symptoms and had negative strep and COVID testing and was ultimately prescribed prednisone and an inhaler.  Patient symptoms have been worsening since the onset.     Pt is afebrile on arrival.  Exam as above.  Patient declines COVID-19 and influenza testing.  Given prolonged URI/sinus symptoms, will treat with antibiotics.  Patient also has diffuse expiratory wheezing throughout lung fields on exam and will therefore also add on Albuterol and Prednisone.  Patient is not hypoxic.  No respiratory distress.  Encouraged symptomatic treatments at home.  Return precautions were reviewed.  Hand-outs were provided.    Patient was sent with Augmentin, Prednisone burst, and Albuterol inhaler and was instructed to follow-up with PCP for continued care and management.  She is to return to the ED for persistent and/or worsening symptoms.  Patient expressed understanding of the diagnosis and plan and was discharged home in good condition.    I have reviewed the nursing notes.    I have reviewed the findings, diagnosis, plan and need for follow up with the patient.      New Prescriptions    ALBUTEROL (PROAIR HFA/PROVENTIL HFA/VENTOLIN HFA) 108 (90 BASE) MCG/ACT INHALER    Inhale 2 puffs  into the lungs every 6 hours as needed for shortness of breath or wheezing    AMOXICILLIN-CLAVULANATE (AUGMENTIN) 875-125 MG TABLET    Take 1 tablet by mouth 2 times daily for 7 days    PREDNISONE (DELTASONE) 20 MG TABLET    Take two tablets (= 40mg) each day for 5 (five) days       Final diagnoses:   Acute sinusitis with symptoms > 10 days   Cough   Acute bronchospasm       11/12/2023   [facility] EMERGENCY DEPT      Disclaimer:  This note consists of symbols derived from keyboarding, dictation and/or voice recognition software.  As a result, there may be errors in the script that have gone undetected.  Please consider this when interpreting information found in this chart.     Sallie Lancaster PA-C  11/12/23 [id number]

## 2023-11-24 ENCOUNTER — MYC MEDICAL ADVICE (OUTPATIENT)
Dept: FAMILY MEDICINE | Facility: CLINIC | Age: 38
End: 2023-11-24
Payer: COMMERCIAL

## 2023-11-27 NOTE — TELEPHONE ENCOUNTER
Called pt and left message with direct call back number ([phone]) and after hours number ([phone]).     Renay Chaparro RN BSN  [facility]

## 2024-03-13 DIAGNOSIS — F43.23 ADJUSTMENT DISORDER WITH MIXED ANXIETY AND DEPRESSED MOOD: ICD-10-CM

## 2024-03-14 RX ORDER — SERTRALINE HYDROCHLORIDE 100 MG/1
100 TABLET, FILM COATED ORAL DAILY
Qty: 90 TABLET | Refills: 0 | Status: SHIPPED | OUTPATIENT
Start: 2024-03-14 | End: 2024-04-19

## 2024-04-19 ENCOUNTER — OFFICE VISIT (OUTPATIENT)
Dept: FAMILY MEDICINE | Facility: CLINIC | Age: 39
End: 2024-04-19
Payer: COMMERCIAL

## 2024-04-19 VITALS
RESPIRATION RATE: 17 BRPM | HEART RATE: 84 BPM | DIASTOLIC BLOOD PRESSURE: 84 MMHG | HEIGHT: 69 IN | BODY MASS INDEX: 39.58 KG/M2 | WEIGHT: 267.2 LBS | OXYGEN SATURATION: 97 % | TEMPERATURE: 97.9 F | SYSTOLIC BLOOD PRESSURE: 136 MMHG

## 2024-04-19 DIAGNOSIS — Z91.89 AT RISK FOR OBSTRUCTIVE SLEEP APNEA: ICD-10-CM

## 2024-04-19 DIAGNOSIS — F43.23 ADJUSTMENT DISORDER WITH MIXED ANXIETY AND DEPRESSED MOOD: ICD-10-CM

## 2024-04-19 DIAGNOSIS — Z23 NEED FOR VACCINATION: ICD-10-CM

## 2024-04-19 DIAGNOSIS — E78.2 MIXED HYPERLIPIDEMIA: ICD-10-CM

## 2024-04-19 DIAGNOSIS — G47.19 EXCESSIVE DAYTIME SLEEPINESS: Primary | ICD-10-CM

## 2024-04-19 PROCEDURE — 96127 BRIEF EMOTIONAL/BEHAV ASSMT: CPT | Performed by: FAMILY MEDICINE

## 2024-04-19 PROCEDURE — 90746 HEPB VACCINE 3 DOSE ADULT IM: CPT | Performed by: FAMILY MEDICINE

## 2024-04-19 PROCEDURE — 90471 IMMUNIZATION ADMIN: CPT | Performed by: FAMILY MEDICINE

## 2024-04-19 PROCEDURE — 99214 OFFICE O/P EST MOD 30 MIN: CPT | Mod: 25 | Performed by: FAMILY MEDICINE

## 2024-04-19 RX ORDER — SERTRALINE HYDROCHLORIDE 100 MG/1
100 TABLET, FILM COATED ORAL DAILY
Qty: 90 TABLET | Refills: 1 | Status: SHIPPED | OUTPATIENT
Start: 2024-04-19 | End: 2024-08-20

## 2024-04-19 ASSESSMENT — ANXIETY QUESTIONNAIRES
6. BECOMING EASILY ANNOYED OR IRRITABLE: SEVERAL DAYS
3. WORRYING TOO MUCH ABOUT DIFFERENT THINGS: NOT AT ALL
GAD7 TOTAL SCORE: 1
IF YOU CHECKED OFF ANY PROBLEMS ON THIS QUESTIONNAIRE, HOW DIFFICULT HAVE THESE PROBLEMS MADE IT FOR YOU TO DO YOUR WORK, TAKE CARE OF THINGS AT HOME, OR GET ALONG WITH OTHER PEOPLE: NOT DIFFICULT AT ALL
7. FEELING AFRAID AS IF SOMETHING AWFUL MIGHT HAPPEN: NOT AT ALL
2. NOT BEING ABLE TO STOP OR CONTROL WORRYING: NOT AT ALL
5. BEING SO RESTLESS THAT IT IS HARD TO SIT STILL: NOT AT ALL
GAD7 TOTAL SCORE: 1
1. FEELING NERVOUS, ANXIOUS, OR ON EDGE: NOT AT ALL

## 2024-04-19 ASSESSMENT — PAIN SCALES - GENERAL: PAINLEVEL: NO PAIN (0)

## 2024-04-19 ASSESSMENT — PATIENT HEALTH QUESTIONNAIRE - PHQ9
SUM OF ALL RESPONSES TO PHQ QUESTIONS 1-9: 5
5. POOR APPETITE OR OVEREATING: NOT AT ALL

## 2024-04-19 NOTE — NURSING NOTE
Prior to immunization administration, verified patients identity using patient s name and date of birth. Please see Immunization Activity for additional information.     Screening Questionnaire for Adult Immunization    Are you sick today?   No   Do you have allergies to medications, food, a vaccine component or latex?   No   Have you ever had a serious reaction after receiving a vaccination?   No   Do you have a long-term health problem with heart, lung, kidney, or metabolic disease (e.g., diabetes), asthma, a blood disorder, no spleen, complement component deficiency, a cochlear implant, or a spinal fluid leak?  Are you on long-term aspirin therapy?   No   Do you have cancer, leukemia, HIV/AIDS, or any other immune system problem?   No   Do you have a parent, brother, or sister with an immune system problem?   No   In the past 3 months, have you taken medications that affect  your immune system, such as prednisone, other steroids, or anticancer drugs; drugs for the treatment of rheumatoid arthritis, Crohn s disease, or psoriasis; or have you had radiation treatments?   No   Have you had a seizure, or a brain or other nervous system problem?   No   During the past year, have you received a transfusion of blood or blood    products, or been given immune (gamma) globulin or antiviral drug?   No   For women: Are you pregnant or is there a chance you could become       pregnant during the next month?   No   Have you received any vaccinations in the past 4 weeks?   No     Immunization questionnaire answers were all negative.      Patient instructed to remain in clinic for 15 minutes afterwards, and to report any adverse reactions.     Screening performed by Ashly Yanes CMA on 4/19/2024 at 1:45 PM.

## 2024-04-19 NOTE — PROGRESS NOTES
"  Assessment & Plan     Merlyn was seen today for recheck medication.    Diagnoses and all orders for this visit:    Excessive daytime sleepiness. At risk for obstructive sleep apnea  -    STOP BANG score = 4   -    Adult Sleep Eval & Management  Referral; Future    Adjustment disorder with mixed anxiety and depressed mood, stable  -  PHQ-9/CLARE 7 completed, see below/Epic for details       -  Refill: sertraline (ZOLOFT) 100 MG tablet; Take 1 tablet (100 mg) by mouth daily    Mixed hyperlipidemia, on Lipitor  -     Lipid panel reflex to direct LDL Fasting; Future    Need for vaccination  -     HEPATITIS B, ADULT 20+ (ENGERIX-B/RECOMBIVAX HB)        BMI  Estimated body mass index is 39.46 kg/m  as calculated from the following:    Height as of this encounter: 1.753 m (5' 9\").    Weight as of this encounter: 121.2 kg (267 lb 3.2 oz).   Weight management plan: Discussed healthy diet and exercise guidelines      Return in about 2 months (around 6/19/2024) for Physical Exam and as needed throughout the year. 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       Subjective   Merlyn is a 38 year old, presenting for the following health issues:  Recheck Medication        4/19/2024     1:17 PM   Additional Questions   Roomed by Ashly     History of Present Illness       Reason for visit:  Medication follow up    She eats 0-1 servings of fruits and vegetables daily.She consumes 0 sweetened beverage(s) daily.She exercises with enough effort to increase her heart rate 20 to 29 minutes per day.  She exercises with enough effort to increase her heart rate 3 or less days per week.   She is taking medications regularly.     Is a known history of mixed anxiety and depressed mood-  Stable on sertraline 100 mg daily.  Tolerating well, no side effects reported.  Reports excessive daytime fatigue/sleepiness.  Admits to snoring at night.   No prior sleep study evaluation done.        4/19/2024     1:33 PM   Last " PHQ-9   1.  Little interest or pleasure in doing things 0   2.  Feeling down, depressed, or hopeless 0   3.  Trouble falling or staying asleep, or sleeping too much 3   4.  Feeling tired or having little energy 1   5.  Poor appetite or overeating 1   6.  Feeling bad about yourself 0   7.  Trouble concentrating 0   8.  Moving slowly or restless 0   Q9: Thoughts of better off dead/self-harm past 2 weeks 0   PHQ-9 Total Score 5   Difficulty at work, home, or with people Somewhat difficult         4/19/2024     1:33 PM   CLARE-7    1. Feeling nervous, anxious, or on edge 0   2. Not being able to stop or control worrying 0   3. Worrying too much about different things 0   4. Trouble relaxing 0   5. Being so restless that it is hard to sit still 0   6. Becoming easily annoyed or irritable 1   7. Feeling afraid, as if something awful might happen 0   CLARE-7 Total Score 1   If you checked any problems, how difficult have they made it for you to do your work, take care of things at home, or get along with other people? Not difficult at all     In the past two weeks have you had thoughts of suicide or self-harm?  No.    Do you have concerns about your personal safety or the safety of others?   No    Possible Sleep Apnea: Reports excessive daytime fatigue with snoring      4/19/2024     1:28 PM   STOP-Bang Total Score   Total Score 4       HYPERLIPIDEMIA - Patient has a long history of significant Hyperlipidemia requiring medication for treatment with recent fair control. Patient reports no problems or side effects with the medication - Atorvastatin.  Last lipid panel-  Recent Labs   Lab Test 06/21/23  0836 05/09/22  0720   CHOL 232* 192   HDL 41* 43*   * 90   TRIG 324* 295*     HEALTH CARE MAINTENANCE: Due for 3rd shot of Hep B vaccine.     Review of Systems  Constitutional, HEENT, cardiovascular, pulmonary, gi and gu systems are negative, except as otherwise noted.      Objective    /84   Pulse 84   Temp 97.9  F  "(36.6  C) (Tympanic)   Resp 17   Ht 1.753 m (5' 9\")   Wt 121.2 kg (267 lb 3.2 oz)   SpO2 97%   BMI 39.46 kg/m    Body mass index is 39.46 kg/m .  Physical Exam   GENERAL: alert and no distress  PSYCH: mentation appears normal, affect normal/bright    DATA  Recent labs reviewed.         Signed Electronically by: Madelin Burrell MD    "

## 2024-08-20 ENCOUNTER — OFFICE VISIT (OUTPATIENT)
Dept: FAMILY MEDICINE | Facility: CLINIC | Age: 39
End: 2024-08-20
Payer: COMMERCIAL

## 2024-08-20 VITALS
SYSTOLIC BLOOD PRESSURE: 96 MMHG | BODY MASS INDEX: 38.27 KG/M2 | DIASTOLIC BLOOD PRESSURE: 84 MMHG | RESPIRATION RATE: 20 BRPM | WEIGHT: 258.4 LBS | OXYGEN SATURATION: 100 % | HEART RATE: 72 BPM | TEMPERATURE: 98.1 F | HEIGHT: 69 IN

## 2024-08-20 DIAGNOSIS — E66.01 MORBID OBESITY (H): ICD-10-CM

## 2024-08-20 DIAGNOSIS — F43.23 ADJUSTMENT DISORDER WITH MIXED ANXIETY AND DEPRESSED MOOD: ICD-10-CM

## 2024-08-20 DIAGNOSIS — N92.1 MENORRHAGIA WITH IRREGULAR CYCLE: ICD-10-CM

## 2024-08-20 DIAGNOSIS — E78.5 HYPERLIPIDEMIA LDL GOAL <100: ICD-10-CM

## 2024-08-20 DIAGNOSIS — Z00.00 ROUTINE GENERAL MEDICAL EXAMINATION AT A HEALTH CARE FACILITY: Primary | ICD-10-CM

## 2024-08-20 DIAGNOSIS — R49.0 VOICE HOARSENESS: ICD-10-CM

## 2024-08-20 LAB
ALBUMIN SERPL BCG-MCNC: 4.2 G/DL (ref 3.5–5.2)
ALP SERPL-CCNC: 70 U/L (ref 40–150)
ALT SERPL W P-5'-P-CCNC: 25 U/L (ref 0–50)
ANION GAP SERPL CALCULATED.3IONS-SCNC: 15 MMOL/L (ref 7–15)
AST SERPL W P-5'-P-CCNC: 29 U/L (ref 0–45)
BILIRUB SERPL-MCNC: 0.6 MG/DL
BUN SERPL-MCNC: 10.9 MG/DL (ref 6–20)
CALCIUM SERPL-MCNC: 9.3 MG/DL (ref 8.8–10.4)
CHLORIDE SERPL-SCNC: 102 MMOL/L (ref 98–107)
CHOLEST SERPL-MCNC: 194 MG/DL
CREAT SERPL-MCNC: 0.68 MG/DL (ref 0.51–0.95)
EGFRCR SERPLBLD CKD-EPI 2021: >90 ML/MIN/1.73M2
ERYTHROCYTE [DISTWIDTH] IN BLOOD BY AUTOMATED COUNT: 12.8 % (ref 10–15)
FASTING STATUS PATIENT QL REPORTED: YES
FASTING STATUS PATIENT QL REPORTED: YES
GLUCOSE SERPL-MCNC: 96 MG/DL (ref 70–99)
HCO3 SERPL-SCNC: 21 MMOL/L (ref 22–29)
HCT VFR BLD AUTO: 38.3 % (ref 35–47)
HDLC SERPL-MCNC: 39 MG/DL
HGB BLD-MCNC: 13.3 G/DL (ref 11.7–15.7)
LDLC SERPL CALC-MCNC: 88 MG/DL
MCH RBC QN AUTO: 30.7 PG (ref 26.5–33)
MCHC RBC AUTO-ENTMCNC: 34.7 G/DL (ref 31.5–36.5)
MCV RBC AUTO: 89 FL (ref 78–100)
NONHDLC SERPL-MCNC: 155 MG/DL
PLATELET # BLD AUTO: 235 10E3/UL (ref 150–450)
POTASSIUM SERPL-SCNC: 4.1 MMOL/L (ref 3.4–5.3)
PROT SERPL-MCNC: 7.1 G/DL (ref 6.4–8.3)
RBC # BLD AUTO: 4.33 10E6/UL (ref 3.8–5.2)
SODIUM SERPL-SCNC: 138 MMOL/L (ref 135–145)
TRIGL SERPL-MCNC: 336 MG/DL
TSH SERPL DL<=0.005 MIU/L-ACNC: 2.34 UIU/ML (ref 0.3–4.2)
WBC # BLD AUTO: 8.3 10E3/UL (ref 4–11)

## 2024-08-20 PROCEDURE — 80053 COMPREHEN METABOLIC PANEL: CPT | Performed by: FAMILY MEDICINE

## 2024-08-20 PROCEDURE — 99214 OFFICE O/P EST MOD 30 MIN: CPT | Mod: 25 | Performed by: FAMILY MEDICINE

## 2024-08-20 PROCEDURE — 36415 COLL VENOUS BLD VENIPUNCTURE: CPT | Performed by: FAMILY MEDICINE

## 2024-08-20 PROCEDURE — 84443 ASSAY THYROID STIM HORMONE: CPT | Performed by: FAMILY MEDICINE

## 2024-08-20 PROCEDURE — 85027 COMPLETE CBC AUTOMATED: CPT | Performed by: FAMILY MEDICINE

## 2024-08-20 PROCEDURE — 80061 LIPID PANEL: CPT | Performed by: FAMILY MEDICINE

## 2024-08-20 PROCEDURE — 99395 PREV VISIT EST AGE 18-39: CPT | Performed by: FAMILY MEDICINE

## 2024-08-20 RX ORDER — SERTRALINE HYDROCHLORIDE 100 MG/1
100 TABLET, FILM COATED ORAL DAILY
Qty: 90 TABLET | Refills: 1 | Status: SHIPPED | OUTPATIENT
Start: 2024-08-20

## 2024-08-20 RX ORDER — ATORVASTATIN CALCIUM 10 MG/1
10 TABLET, FILM COATED ORAL DAILY
Qty: 90 TABLET | Refills: 3 | Status: SHIPPED | OUTPATIENT
Start: 2024-08-20

## 2024-08-20 SDOH — HEALTH STABILITY: PHYSICAL HEALTH: ON AVERAGE, HOW MANY DAYS PER WEEK DO YOU ENGAGE IN MODERATE TO STRENUOUS EXERCISE (LIKE A BRISK WALK)?: 2 DAYS

## 2024-08-20 ASSESSMENT — PAIN SCALES - GENERAL: PAINLEVEL: NO PAIN (0)

## 2024-08-20 ASSESSMENT — SOCIAL DETERMINANTS OF HEALTH (SDOH): HOW OFTEN DO YOU GET TOGETHER WITH FRIENDS OR RELATIVES?: TWICE A WEEK

## 2024-08-20 NOTE — PROGRESS NOTES
Preventive Care Visit  [facility] ANI Burrell MD, Family Medicine  Aug 20, 2024      Assessment & Plan     Merlyn was seen today for physical.    Diagnoses and all orders for this visit:    Routine general medical examination at a health care facility  -     PRIMARY CARE FOLLOW-UP SCHEDULING; Future  -     CBC with platelets  -     TSH with free T4 reflex  -     Comprehensive metabolic panel (BMP + Alb, Alk Phos, ALT, AST, Total. Bili, TP)    Hyperlipidemia LDL goal <100  -     Refill: atorvastatin (LIPITOR) 10 MG tablet; Take 1 tablet (10 mg) by mouth daily  -     Lipid panel reflex to direct LDL Fasting    Adjustment disorder with mixed anxiety and depressed mood, stable        -      Recent PHQ-9/CLARE 7 reviewed  -     Refill: sertraline (ZOLOFT) 100 MG tablet; Take 1 tablet (100 mg) by mouth daily    Menorrhagia with irregular cycle  -     Ob/Gyn  Referral; Future- patient is interested in an IUD placement.    Voice hoarseness- differentials to include laryngopharyngeal reflux  -     Trial of a PPI: omeprazole (PRILOSEC) 20 MG DR capsule; Take 1 capsule (20 mg) by mouth daily  -     Adult ENT  Referral; Future    Morbid obesity (H)         -   Weight management plan: Discussed healthy diet and exercise guidelines            Patient has been advised of split billing requirements and indicates understanding: Yes        Counseling  Appropriate preventive services were addressed with this patient via screening, questionnaire, or discussion as appropriate for fall prevention, nutrition, physical activity, Tobacco-use cessation, social engagement, weight loss and cognition.  Checklist reviewing preventive services available has been given to the patient.  Reviewed patient's diet, addressing concerns and/or questions.   She is at risk for lack of exercise and has been provided with information to increase physical activity for the benefit of her well-being.   She is at risk  for psychosocial distress and has been provided with information to reduce risk.       Return in about 6 months (around 2025) for Medication check, Virtual Visit..      Subjective   Merlyn is a 39 year old, presenting for the following:  Physical    Patient would still like to discuss the followin.) IUD placement   2.) hoarse voice since the beginning of summer           2024     7:45 AM   Additional Questions   Roomed by Jolene   Accompanied by n/a         2024     7:46 AM   Patient Reported Additional Medications   Patient reports taking the following new medications fish oil            DANIEL Zuleta is a pleasant 39 year old female patient of mine here for her Annual Physical and chronic disease management.     HYPERLIPIDEMIA - Patient has a long history of significant Hyperlipidemia requiring medication for treatment with recent fair control. Patient reports no problems or side effects with the medication- Atorvastatin.   Last lipid panel-  Recent Labs   Lab Test 23  0836 22  0720   CHOL 232* 192   HDL 41* 43*   * 90   TRIG 324* 295*       Depression with anxiety-  Reports that her symptoms have been stable on Sertraline 100 mg/day. Tolerating well. No side effects reported.       2024     1:33 PM   Last PHQ-9   1.  Little interest or pleasure in doing things 0   2.  Feeling down, depressed, or hopeless 0   3.  Trouble falling or staying asleep, or sleeping too much 3   4.  Feeling tired or having little energy 1   5.  Poor appetite or overeating 1   6.  Feeling bad about yourself 0   7.  Trouble concentrating 0   8.  Moving slowly or restless 0   Q9: Thoughts of better off dead/self-harm past 2 weeks 0   PHQ-9 Total Score 5   Difficulty at work, home, or with people Somewhat difficult         2024     1:33 PM   CLARE-7    1. Feeling nervous, anxious, or on edge 0   2. Not being able to stop or control worrying 0   3. Worrying too much about different things 0   4.  Trouble relaxing 0   5. Being so restless that it is hard to sit still 0   6. Becoming easily annoyed or irritable 1   7. Feeling afraid, as if something awful might happen 0   CLARE-7 Total Score 1   If you checked any problems, how difficult have they made it for you to do your work, take care of things at home, or get along with other people? Not difficult at all     In the past two weeks have you had thoughts of suicide or self-harm?  No.    Do you have concerns about your personal safety or the safety of others?   No    Additional Concerns-     Reports worsening heavy menses with clots. Irregular. Does not recall last LMP. States that she doesn't use any contraception.  had vasectomy.   Patient is interested in an IUD placement.     Voice hoarseness- patient reports that she noticed this over the Summer months. 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  Denies having any sore throat. Unsure if she has silent GERD.     Is morbidly obese, trying to lose weight.   Wt Readings from Last 4 Encounters:   08/20/24 117.2 kg (258 lb 6.4 oz)   04/19/24 121.2 kg (267 lb 3.2 oz)   08/22/23 115.8 kg (255 lb 6.4 oz)   06/21/23 117.4 kg (258 lb 12.8 oz)       Last visit patient reported excessive daytime fatigue with sleepiness with risk of SARITA. STOP BANG SCORE was 4. Referral was made to the Sleep medicine Team. Patient has not gone yet.     HEALTH CARE MAINTENANCE: Due for labs- fasting.      .      8/20/2024   General Health   How would you rate your overall physical health? (!) FAIR   Feel stress (tense, anxious, or unable to sleep) Only a little      (!) STRESS CONCERN      8/20/2024   Nutrition   Three or more servings of calcium each day? Yes   Diet: Regular (no restrictions)   How many servings of fruit and vegetables per day? (!) 2-3   How many sweetened beverages each day? 0-1            8/20/2024   Exercise   Days per week of moderate/strenous exercise 2 days      (!) EXERCISE  CONCERN      8/20/2024   Social Factors   Frequency of gathering with friends or relatives Twice a week   Worry food won't last until get money to buy more No   Food not last or not have enough money for food? No   Do you have housing? (Housing is defined as stable permanent housing and does not include staying ouside in a car, in a tent, in an abandoned building, in an overnight shelter, or couch-surfing.) No   Are you worried about losing your housing? No   Lack of transportation? No   Unable to get utilities (heat,electricity)? No   Want help with housing or utility concern? No      (!) HOUSING CONCERN PRESENT      8/20/2024   Dental   Dentist two times every year? Yes            6/24/2024   TB Screening   Were you born outside of the [place]? No              Today's PHQ-2 Score:       6/24/2024     7:04 PM   PHQ-2 ( 1999 [employer])   Q1: Little interest or pleasure in doing things 0   Q2: Feeling down, depressed or hopeless 0   PHQ-2 Score 0   Q1: Little interest or pleasure in doing things Not at all   Q2: Feeling down, depressed or hopeless Not at all   PHQ-2 Score 0         8/20/2024   Substance Use   Alcohol more than 3/day or more than 7/wk No   Do you use any other substances recreationally? (!) CANNABIS PRODUCTS        Social History     Tobacco Use    Smoking status: Never     Passive exposure: Never    Smokeless tobacco: Never   Vaping Use    Vaping status: Never Used   Substance Use Topics    Alcohol use: Yes     Comment: very little    Drug use: No          Mammogram Screening - Patient under 40 years of age: Routine Mammogram Screening not recommended.         8/20/2024   STI Screening   New sexual partner(s) since last STI/HIV test? No        History of abnormal Pap smear: No - age 30-64 HPV with reflex Pap every 5 years recommended        7/25/2022     1:31 PM 7/27/2016    12:00 AM   PAP / HPV   PAP-ABSTRACT See Scanned Document     See Scanned Document           This result is from an external source.  "          8/20/2024   Contraception/Family Planning   Questions about contraception or family planning (!) YES            Reviewed and updated as needed this visit by Provider                    Past Medical History:   Diagnosis Date    Anxiety     Hyperlipidemia      No past surgical history on file.  OB History   No obstetric history on file.     Lab work is in process  Labs reviewed in EPIC  BP Readings from Last 3 Encounters:   08/20/24 96/84   04/19/24 136/84   11/12/23 (!) 155/95    Wt Readings from Last 3 Encounters:   08/20/24 117.2 kg (258 lb 6.4 oz)   04/19/24 121.2 kg (267 lb 3.2 oz)   08/22/23 115.8 kg (255 lb 6.4 oz)                  Patient Active Problem List   Diagnosis    Hypertriglyceridemia    Other acne    Morbid obesity (H)     No past surgical history on file.    Social History     Tobacco Use    Smoking status: Never     Passive exposure: Never    Smokeless tobacco: Never   Substance Use Topics    Alcohol use: Yes     Comment: very little     Family History   Problem Relation Age of Onset    Hyperlipidemia Mother     Diabetes Father     No Known Problems Brother     No Known Problems Sister          Current Outpatient Medications   Medication Sig Dispense Refill    atorvastatin (LIPITOR) 10 MG tablet Take 1 tablet (10 mg) by mouth daily 90 tablet 3    omeprazole (PRILOSEC) 20 MG DR capsule Take 1 capsule (20 mg) by mouth daily 30 capsule 1    sertraline (ZOLOFT) 100 MG tablet Take 1 tablet (100 mg) by mouth daily 90 tablet 1     Allergies   Allergen Reactions    Ragweeds          Review of Systems  Constitutional, HEENT, cardiovascular, pulmonary, gi and gu systems are negative, except as otherwise noted.     Objective    Exam  BP 96/84   Pulse 72   Temp 98.1  F (36.7  C) (Temporal)   Resp 20   Ht 1.748 m (5' 8.82\")   Wt 117.2 kg (258 lb 6.4 oz)   LMP 08/08/2024 (Exact Date)   SpO2 100%   BMI 38.36 kg/m     Estimated body mass index is 38.36 kg/m  as calculated from the following:    " "Height as of this encounter: 1.748 m (5' 8.82\").    Weight as of this encounter: 117.2 kg (258 lb 6.4 oz).    Physical Exam  GENERAL: alert and no distress  EYES: Eyes grossly normal to inspection, PERRL and conjunctivae and sclerae normal  HENT: ear canals and TM's normal, nose and mouth without ulcers or lesions  NECK: no adenopathy, no asymmetry, masses, or scars  RESP: lungs clear to auscultation - no rales, rhonchi or wheezes  CV: regular rate and rhythm, normal S1 S2, no S3 or S4, no murmur, click or rub, no peripheral edema  ABDOMEN: soft, nontender, no hepatosplenomegaly, no masses and bowel sounds normal  MS: no gross musculoskeletal defects noted, no edema  SKIN: no suspicious lesions or rashes  NEURO: Normal strength and tone, mentation intact and speech normal  PSYCH: mentation appears normal, affect normal/bright    DATA  Labs drawn and in process      Signed Electronically by: Madelin Burrell MD    "

## 2024-08-20 NOTE — PATIENT INSTRUCTIONS
Patient Education   Preventive Care Advice   This is general advice given by our system to help you stay healthy. However, your care team may have specific advice just for you. Please talk to your care team about your preventive care needs.  Nutrition  Eat 5 or more servings of fruits and vegetables each day.  Try wheat bread, brown rice and whole grain pasta (instead of white bread, rice, and pasta).  Get enough calcium and vitamin D. Check the label on foods and aim for 100% of the RDA (recommended daily allowance).  Lifestyle  Exercise at least 150 minutes each week  (30 minutes a day, 5 days a week).  Do muscle strengthening activities 2 days a week. These help control your weight and prevent disease.  No smoking.  Wear sunscreen to prevent skin cancer.  Have a dental exam and cleaning every 6 months.  Yearly exams  See your health care team every year to talk about:  Any changes in your health.  Any medicines your care team has prescribed.  Preventive care, family planning, and ways to prevent chronic diseases.  Shots (vaccines)   HPV shots (up to age 26), if you've never had them before.  Hepatitis B shots (up to age 59), if you've never had them before.  COVID-19 shot: Get this shot when it's due.  Flu shot: Get a flu shot every year.  Tetanus shot: Get a tetanus shot every 10 years.  Pneumococcal, hepatitis A, and RSV shots: Ask your care team if you need these based on your risk.  Shingles shot (for age 50 and up)  General health tests  Diabetes screening:  Starting at age 35, Get screened for diabetes at least every 3 years.  If you are younger than age 35, ask your care team if you should be screened for diabetes.  Cholesterol test: At age 39, start having a cholesterol test every 5 years, or more often if advised.  Bone density scan (DEXA): At age 50, ask your care team if you should have this scan for osteoporosis (brittle bones).  Hepatitis C: Get tested at least once in your life.  STIs (sexually  transmitted infections)  Before age 24: Ask your care team if you should be screened for STIs.  After age 24: Get screened for STIs if you're at risk. You are at risk for STIs (including HIV) if:  You are sexually active with more than one person.  You don't use condoms every time.  You or a partner was diagnosed with a sexually transmitted infection.  If you are at risk for HIV, ask about PrEP medicine to prevent HIV.  Get tested for HIV at least once in your life, whether you are at risk for HIV or not.  Cancer screening tests  Cervical cancer screening: If you have a cervix, begin getting regular cervical cancer screening tests starting at age 21.  Breast cancer scan (mammogram): If you've ever had breasts, begin having regular mammograms starting at age 40. This is a scan to check for breast cancer.  Colon cancer screening: It is important to start screening for colon cancer at age 45.  Have a colonoscopy test every 10 years (or more often if you're at risk) Or, ask your provider about stool tests like a FIT test every year or Cologuard test every 3 years.  To learn more about your testing options, visit:   .  For help making a decision, visit:   https://bit.ly/zy93326.  Prostate cancer screening test: If you have a prostate, ask your care team if a prostate cancer screening test (PSA) at age 55 is right for you.  Lung cancer screening: If you are a current or former smoker ages 50 to 80, ask your care team if ongoing lung cancer screenings are right for you.  For informational purposes only. Not to replace the advice of your health care provider. Copyright   2023 [facility]. All rights reserved. Clinically reviewed by the [facility] Transitions Program. FOB.com 256517 - REV 01/24.  Substance Use Disorder: Care Instructions  Overview     You can improve your life and health by stopping your use of alcohol or drugs. When you don't drink or use drugs, you may feel and sleep better. You may  get along better with your family, friends, and coworkers. There are medicines and programs that can help with substance use disorder.  How can you care for yourself at home?  Here are some ways to help you stay sober and prevent relapse.  If you have been given medicine to help keep you sober or reduce your cravings, be sure to take it exactly as prescribed.  Talk to your doctor about programs that can help you stop using drugs or drinking alcohol.  Do not keep alcohol or drugs in your home.  Plan ahead. Think about what you'll say if other people ask you to drink or use drugs. Try not to spend time with people who drink or use drugs.  Use the time and money spent on drinking or drugs to do something that's important to you.  Preventing a relapse  Have a plan to deal with relapse. Learn to recognize changes in your thinking that lead you to drink or use drugs. Get help before you start to drink or use drugs again.  Try to stay away from situations, friends, or places that may lead you to drink or use drugs.  If you feel the need to drink alcohol or use drugs again, seek help right away. Call a trusted friend or family member. Some people get support from organizations such as Narcotics Anonymous or [employer] or from treatment facilities.  If you relapse, get help as soon as you can. Some people make a plan with another person that outlines what they want that person to do for them if they relapse. The plan usually includes how to handle the relapse and who to notify in case of relapse.  Don't give up. Remember that a relapse doesn't mean that you have failed. Use the experience to learn the triggers that lead you to drink or use drugs. Then quit again. Recovery is a lifelong process. Many people have several relapses before they are able to quit for good.  Follow-up care is a key part of your treatment and safety. Be sure to make and go to all appointments, and call your doctor if you are having problems. It's  "also a good idea to know your test results and keep a list of the medicines you take.  When should you call for help?   Call 911  anytime you think you may need emergency care. For example, call if you or someone else:    Has overdosed or has withdrawal signs. Be sure to tell the emergency workers that you are or someone else is using or trying to quit using drugs. Overdose or withdrawal signs may include:  Losing consciousness.  Seizure.  Seeing or hearing things that aren't there (hallucinations).     Is thinking or talking about suicide or harming others.   Where to get help 24 hours a day, 7 days a week   If you or someone you know talks about suicide, self-harm, a mental health crisis, a substance use crisis, or any other kind of emotional distress, get help right away. You can:    Call the Suicide and Crisis Lifeline at 988.     Call 6-414-076-TALK (1-622.128.1858).     Text HOME to 626568 to access the Crisis Text Line.   Consider saving these numbers in your phone.  Go to [employer] for more information or to chat online.  Call your doctor now or seek immediate medical care if:    You are having withdrawal symptoms. These may include nausea or vomiting, sweating, shakiness, and anxiety.   Watch closely for changes in your health, and be sure to contact your doctor if:    You have a relapse.     You need more help or support to stop.   Where can you learn more?  Go to https://www.mSchool.net/patiented  Enter H573 in the search box to learn more about \"Substance Use Disorder: Care Instructions.\"  Current as of: November 15, 2023               Content Version: 14.0    2074-8879 [employer].   Care instructions adapted under license by your healthcare professional. If you have questions about a medical condition or this instruction, always ask your healthcare professional. [employer] disclaims any warranty or liability for your use of this information.         "

## 2024-09-18 DIAGNOSIS — R49.0 VOICE HOARSENESS: ICD-10-CM

## 2024-10-08 ENCOUNTER — OFFICE VISIT (OUTPATIENT)
Dept: OBGYN | Facility: CLINIC | Age: 39
End: 2024-10-08
Payer: COMMERCIAL

## 2024-10-08 VITALS — DIASTOLIC BLOOD PRESSURE: 82 MMHG | WEIGHT: 258 LBS | BODY MASS INDEX: 38.3 KG/M2 | SYSTOLIC BLOOD PRESSURE: 125 MMHG

## 2024-10-08 DIAGNOSIS — N93.9 ABNORMAL UTERINE BLEEDING (AUB): Primary | ICD-10-CM

## 2024-10-08 DIAGNOSIS — F41.9 ANXIETY: ICD-10-CM

## 2024-10-08 LAB — HCG UR QL: NEGATIVE

## 2024-10-08 PROCEDURE — 81025 URINE PREGNANCY TEST: CPT

## 2024-10-08 PROCEDURE — 99204 OFFICE O/P NEW MOD 45 MIN: CPT

## 2024-10-08 RX ORDER — ALPRAZOLAM 1 MG/1
TABLET ORAL
Qty: 1 TABLET | Refills: 0 | Status: SHIPPED | OUTPATIENT
Start: 2024-10-08

## 2024-10-08 NOTE — PROGRESS NOTES
Subjective:  Merlyn is a 39 year old   is here today with the following concerns:    AUB: x 1 year she has had 1 week of spotting prior to her monthly menses. Menses last 6 days and are heavy but she does not know how much she is bleeding because she uses the Diva cup. She does have cramping with her periods. She was on OCPs prior to having her two children but has not been on anything x 5 years.  has vasectomy.     ROS: Pertinent ROS as above.    Medical history  OB History    Para Term  AB Living   2 2 2 0 0 2   SAB IAB Ectopic Multiple Live Births   0 0 0 0 2      # Outcome Date GA Lbr Lazaro/2nd Weight Sex Type Anes PTL Lv   2 Term 18 38w6d  4.975 kg (10 lb 15.5 oz) M Vag-Spont   SHANAE      Name: MERYL MARKHAM      Apgar1: 8  Apgar5: 9   1 Term 02/18/15 39w3d  4.43 kg (9 lb 12.3 oz) M Vag-Spont  N SHANAE      Birth Comments:       Name: Greg      Apgar1: 8  Apgar5: 9      Past Medical History:   Diagnosis Date    Anxiety     Hyperlipidemia       History reviewed. No pertinent surgical history.    ALL/Meds: Her medication and allergy histories were reviewed and are documented in their appropriate chart areas.    SH: Reviewed and documented in the appropriate area of the chart.  FH:  Her family history is reviewed and updated in the chart, today.  PMH: Her past medical, surgical, and obstetric histories were reviewed and updated today in the appropriate chart areas.    Objective:  PE: /82   Wt 117 kg (258 lb)   LMP 2024 (Approximate)   BMI 38.30 kg/m    Body mass index is 38.3 kg/m .    Pertinent Physical exam findings:    GENERAL: Active, alert, in no acute distress.  SKIN: Clear. No significant rash, abnormal pigmentation or lesions  MS: no gross musculoskeletal defects noted, no edema  PSYCH: Age-appropriate alertness and orientation    Pelvic: deferred    A/P:  Merlyn Markham is a 39 year old  here today with the following concerns:  (N93.9) Abnormal  uterine bleeding (AUB)  Comment: We discussed the various etiologies of abnormal bleeding such as fibroids, polyps, adenomyosis, malignancy, hyperplasia, infection, bleeding disorder, thyroid/endocrine dysfunction, and ovarian dysfunction. Based on her presenting history and physical exam, fibroids, polyps, hyperplasia, adenomyosis are high on the list of differentials. Will proceed with the following below to further evaluate and manage her bleeding.  After TVUS is completed, she will return for EMBx and Mirena IUD placement. She is agreeable and has no questions/concerns.  Plan: US Pelvic Complete with Transvaginal          (F41.9) Anxiety  Comment: Discussed Xanax x 1 15-30 min prior to EMBx and Mirena IUD placement.  Plan: ALPRAZolam (XANAX) 1 MG tablet          She is agreeable to the plan above and has no further questions or concerns. She did not request a chaperone for the physical exam component of the visit today.     RAMOS Pratt CNP

## 2024-10-15 DIAGNOSIS — R49.0 VOICE HOARSENESS: ICD-10-CM

## 2024-10-16 ENCOUNTER — ANCILLARY PROCEDURE (OUTPATIENT)
Dept: ULTRASOUND IMAGING | Facility: CLINIC | Age: 39
End: 2024-10-16
Payer: COMMERCIAL

## 2024-10-16 DIAGNOSIS — N93.9 ABNORMAL UTERINE BLEEDING (AUB): ICD-10-CM

## 2024-10-16 PROCEDURE — 76830 TRANSVAGINAL US NON-OB: CPT | Mod: TC | Performed by: RADIOLOGY

## 2024-10-16 PROCEDURE — 76856 US EXAM PELVIC COMPLETE: CPT | Mod: TC | Performed by: RADIOLOGY

## 2024-10-24 ENCOUNTER — ANCILLARY PROCEDURE (OUTPATIENT)
Dept: GENERAL RADIOLOGY | Facility: CLINIC | Age: 39
End: 2024-10-24
Attending: PEDIATRICS
Payer: COMMERCIAL

## 2024-10-24 ENCOUNTER — OFFICE VISIT (OUTPATIENT)
Dept: ORTHOPEDICS | Facility: CLINIC | Age: 39
End: 2024-10-24
Payer: COMMERCIAL

## 2024-10-24 DIAGNOSIS — M25.551 RIGHT HIP PAIN: Primary | ICD-10-CM

## 2024-10-24 DIAGNOSIS — M25.851 RIGHT HIP IMPINGEMENT SYNDROME: ICD-10-CM

## 2024-10-24 PROCEDURE — 73502 X-RAY EXAM HIP UNI 2-3 VIEWS: CPT | Mod: TC | Performed by: INTERNAL MEDICINE

## 2024-10-24 PROCEDURE — 99204 OFFICE O/P NEW MOD 45 MIN: CPT | Performed by: PEDIATRICS

## 2024-10-24 NOTE — LETTER
10/24/2024      Merlyn Markham  [id number]  [place] [address]      Dear Colleague,    Thank you for referring your patient, Merlyn Marhkam, to the [facility] [place]. Please see a copy of my visit note below.    ASSESSMENT & PLAN    Merlyn was seen today for pain.    Diagnoses and all orders for this visit:    Right hip pain  -     XR Pelvis w Hip RT 1 View  -     Physical Therapy  Referral; Future    Right hip impingement syndrome  -     Physical Therapy  Referral; Future    Other orders  -     Sports Med Adult Follow-Up Clinic Order (Blank); Future      This issue is chronic and Worsening.        ICD-10-CM    1. Right hip pain  M25.551 XR Pelvis w Hip RT 1 View     Physical Therapy  Referral      2. Right hip impingement syndrome  M25.851 Physical Therapy  Referral          We discussed the following treatment options: symptom treatment, activity modification/rest, imaging, rehab and referral. Following discussion, plan: will start with physical therapy, close follow up if not improving.    Plan:  - Today's Plan of Care:  Rehab: Physical Therapy: [facility] - [phone]  Referral placed to pelvic floor PT to evaluate hip    Discussed activity considerations and other supportive care including Ice/Heat, OTC and other topical medications as needed.    -We also discussed other future treatment options:  MRI if not improving    Follow Up: 6 - 8 weeks    Concerning signs and symptoms were reviewed and all questions were answered at this time.    Lesley Steve MD [facility]  Sports Medicine Physician  [facility]    -----  Chief Complaint   Patient presents with     Right Hip - Pain       SUBJECTIVE  Merlyn Markham is a/an 39 year old female who is seen as a self referral for evaluation of right hip pain .     The patient is seen by themselves.    Onset: 3 month(s) ago. Reports insidious onset without acute precipitating  event.  Pain was intermittent until 3 weeks ago when she went on a hike in [place]    Location of Pain: right hip, groin  Worsened by: laying down, laying ON the hip, hip flexion, hiking (later on), in the AM, bending over to grab something   Better with: nothing   Treatments tried: Tylenol, ibuprofen, and some stretching, positioning in bed   Associated symptoms: pain with positions and movement   - Does have some pelvic floor dysfunction, stress incontinence    Orthopedic/Surgical history: NO  Social History/Occupation: teacher, on her feet a lot.       REVIEW OF SYSTEMS:  Review of Systems    OBJECTIVE:  LMP 09/27/2024 (Approximate)    General: healthy, alert and in no distress  Skin: no suspicious lesions or rash.  CV: distal perfusion intact   Resp: normal respiratory effort without conversational dyspnea   Psych: normal mood and affect  Gait: NORMAL  Neuro: Normal light sensory exam of upper extremity    Right hip exam    Inspection:      no edema or ecchymosis in hip area    Tender:      none right - points to groin    Non Tender:      remainder of the hip area bilateral    ROM:     Full active and passive ROM  bilateral    Strength:      flexion 5/5 bilateral       abduction 5/5 bilateral       adduction 5/5 bilateral    Sensation:      grossly intact in hip and thigh    Special Tests:      neg (-) JAMEEL right       positive (+) FADIR right      RADIOLOGY:  Final results and radiologist's interpretation, available in the [facility] health record.  Images were reviewed with the patient in the office today.  My personal interpretation of the performed imaging:    AP Pelvis and right lateral XR views of hip reviewed: no acute bony abnormality, some acetabular overcoverage, no significant degenerative change  - will follow official read    Review of the result(s) of each unique test - XR             Again, thank you for allowing me to participate in the care of your patient.        Sincerely,        Lesley  MD Power

## 2024-10-24 NOTE — PATIENT INSTRUCTIONS
We discussed the following treatment options: symptom treatment, activity modification/rest, imaging, rehab and referral. Following discussion, plan: will start with physical therapy, close follow up if not improving.    Plan:  - Today's Plan of Care:  Rehab: Physical Therapy: [facility] - [phone]    Discussed activity considerations and other supportive care including Ice/Heat, OTC and other topical medications as needed.    -We also discussed other future treatment options:  MRI if not improving    Follow Up: 6 - 8 weeks    If you have any further questions for your physician or physician s care team you can call [phone].

## 2024-10-24 NOTE — PROGRESS NOTES
ASSESSMENT & PLAN    Merlyn was seen today for pain.    Diagnoses and all orders for this visit:    Right hip pain  -     XR Pelvis w Hip RT 1 View  -     Physical Therapy  Referral; Future    Right hip impingement syndrome  -     Physical Therapy  Referral; Future    Other orders  -     Sports Med Adult Follow-Up Clinic Order (Blank); Future      This issue is chronic and Worsening.        ICD-10-CM    1. Right hip pain  M25.551 XR Pelvis w Hip RT 1 View     Physical Therapy  Referral      2. Right hip impingement syndrome  M25.851 Physical Therapy  Referral          We discussed the following treatment options: symptom treatment, activity modification/rest, imaging, rehab and referral. Following discussion, plan: will start with physical therapy, close follow up if not improving.    Plan:  - Today's Plan of Care:  [facility]hab[facility] [phone]  Referral placed to pelvic floor PT to evaluate hip    Discussed activity considerations and other supportive care including Ice/Heat, OTC and other topical medications as needed.    -We also discussed other future treatment options:  MRI if not improving    Follow Up: 6 - 8 weeks    Concerning signs and symptoms were reviewed and all questions were answered at this time.    Lesley Steve MD [facility]  Sports Medicine Physician  [facility]    -----  Chief Complaint   Patient presents with    Right Hip - Pain       SUBJECTIVE  Merlyn Markham is a/an 39 year old female who is seen as a self referral for evaluation of right hip pain .     The patient is seen by themselves.    Onset: 3 month(s) ago. Reports insidious onset without acute precipitating event.  Pain was intermittent until 3 weeks ago when she went on a hike in [place]    Location of Pain: right hip, groin  Worsened by: laying down, laying ON the hip, hip flexion, hiking (later on), in the AM, bending over to grab something    Better with: nothing   Treatments tried: Tylenol, ibuprofen, and some stretching, positioning in bed   Associated symptoms: pain with positions and movement   - Does have some pelvic floor dysfunction, stress incontinence    Orthopedic/Surgical history: NO  Social History/Occupation: teacher, on her feet a lot.       REVIEW OF SYSTEMS:  Review of Systems    OBJECTIVE:  LMP 09/27/2024 (Approximate)    General: healthy, alert and in no distress  Skin: no suspicious lesions or rash.  CV: distal perfusion intact   Resp: normal respiratory effort without conversational dyspnea   Psych: normal mood and affect  Gait: NORMAL  Neuro: Normal light sensory exam of upper extremity    Right hip exam    Inspection:      no edema or ecchymosis in hip area    Tender:      none right - points to groin    Non Tender:      remainder of the hip area bilateral    ROM:     Full active and passive ROM  bilateral    Strength:      flexion 5/5 bilateral       abduction 5/5 bilateral       adduction 5/5 bilateral    Sensation:      grossly intact in hip and thigh    Special Tests:      neg (-) JAMEEL right       positive (+) FADIR right      RADIOLOGY:  Final results and radiologist's interpretation, available in the [facility] health record.  Images were reviewed with the patient in the office today.  My personal interpretation of the performed imaging:    AP Pelvis and right lateral XR views of hip reviewed: no acute bony abnormality, some acetabular overcoverage, no significant degenerative change  - will follow official read    Review of the result(s) of each unique test - XR

## 2024-11-04 NOTE — PATIENT INSTRUCTIONS
If you have labs or imaging done, the results will automatically release in [place] Orders without an interpretation.  Your health care professional will review those results and send an interpretation with recommendations as soon as possible, but this may be 1-3 business days.    If you have any questions regarding your visit, please contact your care team.     "[employer] Access Services: [phone]  [facility] CLINIC HOURS TELEPHONE NUMBER   Nguyen Bartholomew, KANE Macario-GARY Garcia-GARY Dunbar-Surgery Scheduler  Shelley-       Monday- [place]  8:00 am-4:00 pm    Tuesday- [place]  8:00 am-4:00 pm    Wednesday- [place] 8:00 am-4:00 pm    Thursday- [place] 8:00 am-4:00 pm    Friday- [place]  8:00 am-4:00 pm [facility]  [address] [place]  [place] [address]  PH: [phone]  Fax: [phone]    Imaging Scheduling all locations  PH: [phone]     [facility]  [address] [place] [address] [address].  [place], [place] [id number] [phone]    [facility]  [address] [place] [address]  PH: [phone]     **Surgeries** Our Surgery Schedulers will contact you to schedule. If you do not receive a call within 3 business days, please call [phone].  Urgent Care locations:  [facility]       Monday-Friday   10 am - 8 pm    Saturday and Sunday   9 am - 5 pm   [phone]   If you need a medication refill, please contact your pharmacy. Please allow 3 business days for your refill to be completed.  As always, Thank you for trusting us with your healthcare needs!

## 2024-11-04 NOTE — PROGRESS NOTES
Procedure: ENDOMETRIAL BIOPSY     Risks benefits and alternatives of the procedure were discussed with the patient. All questions were answered and consent was signed. A speculum was inserted with visualization of the cervix. Cervix was cleaned with Betadine solution. The anterior lip of the cervix was grasped with a single-toothed tenaculum. The EMB Pipelle was inserted to the uterine fundus without difficulty and the uterus was sounded to 8 cm. The Pipelle was withdrawn as it was rotated 3-4 times to obtain the sample. 3 passes were made with appropriate sampling. Endometrial specimen was sent to pathology in formalin.  The biopsy was performed without complication.    The patient tolerated the procedure well.  We will contact her with her pathology result.       IUD Insertion:  CONSULT:    Is a pregnancy test required: No.  Was a consent obtained?  Yes    Subjective: Merlyn Markham is a 39 year old  presents for IUD and desires Mirena type IUD.    Patient has been given the opportunity to ask questions about all forms of birth control, including all options appropriate for Merlyn Markham. Discussed that no method of birth control, except abstinence is 100% effective against pregnancy or sexually transmitted infection.     Merlyn Markham understands she may have the IUD removed at any time. IUD should be removed by a health care provider.    The entire insertion procedure was reviewed with the patient, including care after placement.    Patient's last menstrual period was 2024 (approximate). Has current contraception. No allergy to betadine or shellfish.   hCG Urine Qualitative   Date Value Ref Range Status   10/08/2024 Negative Negative Final     Comment:     This test is for screening purposes.  Results should be interpreted along with the clinical picture.  Confirmation testing is available if warranted by ordering VVS319, HCG Quantitative Pregnancy.     LMP 2024 (Approximate)     Pelvic  Exam:   EG/BUS: normal genital architecture without lesions, erythema or abnormal secretions.   Vagina: moist, pink, rugae with physiologic discharge and secretions  Cervix: multiparous no lesions and pink, moist, closed, without lesion or CMT  Uterus: anteverted position, mobile, no pain  Adnexa: within normal limits and no masses, nodularity, tenderness    PROCEDURE NOTE: -- IUD Insertion    Reason for Insertion: abnormal uterine bleeding    Under sterile technique, cervix was visualized with speculum and prepped with Betadine solution swab x 3. Allis was placed for stability. The uterus was gently straightened and sounded to 8.0 cm. IUD prepared for placement, and IUD inserted according to 's instructions without difficulty or significant resitance, and deployed at the fundus. The strings were visualized and trimmed to 3.0 cm from the external os. Tenaculum was removed and hemostasis noted. Speculum removed.  Patient tolerated procedure well.    EBL: minimal    Complications: none    ASSESSMENT:     ICD-10-CM    1. Encounter for insertion of intrauterine contraceptive device  Z30.430          PLAN:    Given 's handouts, including when to have IUD removed, list of danger s/sx, side effects and follow up recommended. Encouraged condom use for prevention of STD. Back up contraception advised for 7 days if progestin method. Advised to call for any fever, for prolonged or severe pain or bleeding, abnormal vaginal discharge, or unable to palpate strings. She was advised to use pain medications (ibuprofen) as needed for mild to moderate pain. Advised to follow-up in clinic in 4-6 weeks for IUD string check if unable to find strings or as directed by provider.     RAMOS Pratt CNP

## 2024-11-05 ENCOUNTER — OFFICE VISIT (OUTPATIENT)
Dept: OBGYN | Facility: CLINIC | Age: 39
End: 2024-11-05
Payer: COMMERCIAL

## 2024-11-05 VITALS
OXYGEN SATURATION: 100 % | WEIGHT: 264 LBS | DIASTOLIC BLOOD PRESSURE: 82 MMHG | HEART RATE: 76 BPM | BODY MASS INDEX: 39.19 KG/M2 | SYSTOLIC BLOOD PRESSURE: 124 MMHG

## 2024-11-05 DIAGNOSIS — N93.9 ABNORMAL UTERINE BLEEDING (AUB): ICD-10-CM

## 2024-11-05 DIAGNOSIS — Z30.430 ENCOUNTER FOR INSERTION OF INTRAUTERINE CONTRACEPTIVE DEVICE: Primary | ICD-10-CM

## 2024-11-05 DIAGNOSIS — Z97.5 IUD (INTRAUTERINE DEVICE) IN PLACE: ICD-10-CM

## 2024-11-05 PROCEDURE — 58300 INSERT INTRAUTERINE DEVICE: CPT

## 2024-11-05 PROCEDURE — 58100 BIOPSY OF UTERUS LINING: CPT

## 2024-11-05 PROCEDURE — 88305 TISSUE EXAM BY PATHOLOGIST: CPT | Performed by: PATHOLOGY

## 2024-11-06 LAB
PATH REPORT.COMMENTS IMP SPEC: NORMAL
PATH REPORT.COMMENTS IMP SPEC: NORMAL
PATH REPORT.FINAL DX SPEC: NORMAL
PATH REPORT.GROSS SPEC: NORMAL
PATH REPORT.MICROSCOPIC SPEC OTHER STN: NORMAL
PATH REPORT.RELEVANT HX SPEC: NORMAL
PHOTO IMAGE: NORMAL

## 2024-12-05 ENCOUNTER — THERAPY VISIT (OUTPATIENT)
Dept: PHYSICAL THERAPY | Facility: CLINIC | Age: 39
End: 2024-12-05
Attending: PEDIATRICS
Payer: COMMERCIAL

## 2024-12-05 DIAGNOSIS — M25.551 RIGHT HIP PAIN: ICD-10-CM

## 2024-12-05 DIAGNOSIS — M25.851 RIGHT HIP IMPINGEMENT SYNDROME: ICD-10-CM

## 2024-12-11 ENCOUNTER — THERAPY VISIT (OUTPATIENT)
Dept: PHYSICAL THERAPY | Facility: CLINIC | Age: 39
End: 2024-12-11
Attending: PEDIATRICS
Payer: COMMERCIAL

## 2024-12-11 DIAGNOSIS — M25.551 RIGHT HIP PAIN: Primary | ICD-10-CM

## 2024-12-11 PROCEDURE — 97139 UNLISTED THERAPEUTIC PX: CPT | Mod: GP | Performed by: PHYSICAL THERAPIST

## 2024-12-11 PROCEDURE — 97140 MANUAL THERAPY 1/> REGIONS: CPT | Mod: GP | Performed by: PHYSICAL THERAPIST

## 2024-12-11 PROCEDURE — 97530 THERAPEUTIC ACTIVITIES: CPT | Mod: GP | Performed by: PHYSICAL THERAPIST

## 2025-01-08 ENCOUNTER — OFFICE VISIT (OUTPATIENT)
Dept: FAMILY MEDICINE | Facility: CLINIC | Age: 40
End: 2025-01-08
Payer: COMMERCIAL

## 2025-01-08 VITALS
WEIGHT: 263.4 LBS | DIASTOLIC BLOOD PRESSURE: 78 MMHG | BODY MASS INDEX: 39.01 KG/M2 | OXYGEN SATURATION: 100 % | RESPIRATION RATE: 18 BRPM | HEART RATE: 86 BPM | HEIGHT: 69 IN | TEMPERATURE: 97.7 F | SYSTOLIC BLOOD PRESSURE: 124 MMHG

## 2025-01-08 DIAGNOSIS — E66.812 CLASS 2 OBESITY DUE TO EXCESS CALORIES WITHOUT SERIOUS COMORBIDITY WITH BODY MASS INDEX (BMI) OF 38.0 TO 38.9 IN ADULT: ICD-10-CM

## 2025-01-08 DIAGNOSIS — Z13.39 ADHD (ATTENTION DEFICIT HYPERACTIVITY DISORDER) EVALUATION: Primary | ICD-10-CM

## 2025-01-08 DIAGNOSIS — E78.2 MIXED HYPERLIPIDEMIA: ICD-10-CM

## 2025-01-08 DIAGNOSIS — E66.09 CLASS 2 OBESITY DUE TO EXCESS CALORIES WITHOUT SERIOUS COMORBIDITY WITH BODY MASS INDEX (BMI) OF 38.0 TO 38.9 IN ADULT: ICD-10-CM

## 2025-01-08 PROCEDURE — 99214 OFFICE O/P EST MOD 30 MIN: CPT | Performed by: FAMILY MEDICINE

## 2025-01-08 RX ORDER — ATORVASTATIN CALCIUM 20 MG/1
20 TABLET, FILM COATED ORAL DAILY
Qty: 90 TABLET | Refills: 3 | Status: SHIPPED | OUTPATIENT
Start: 2025-01-08

## 2025-01-08 NOTE — PROGRESS NOTES
Assessment & Plan     Merlyn was seen today for a.d.h.d and weight problem.    Diagnoses and all orders for this visit:    ADHD (attention deficit hyperactivity disorder) evaluation  -     9 year old son recently diagnosed with ADHD now on treatment with remarkable improvement. Patient reports exhibiting similar symptoms all her life  -     Adult Mental Health  Referral; Future    Mixed hyperlipidemia, worsening hypertriglyceridemia  -     Increase dose atorvastatin (LIPITOR) 20 MG tablet; Take 1 tablet (20 mg) by mouth daily.  -     Lipid panel reflex to direct LDL Fasting; Future  -     Continue efforts to eat a well balanced heart healthy diet and regular exercise.     Class 2 obesity due to excess calories without serious comorbidity with body mass index (BMI) of 38.0 to 38.9 in adult  -  Weight management plan: Discussed healthy diet and exercise guidelines  Weight loss goals:   Prevent further weight gain   Aim to lose at least 1- 2 lbs/week.   Eat a well balanced heart healthy diet, cut out soda/sugary drinks and control portion sizes.   Avoid missing breakfast.  Exercise regularly; increase exercise gradually as tolerated to a goal of  30-45 minutes/5 days a week.    - Start: Tirzepatide 2.5 MG/0.5ML SOAJ; Inject 0.5 mLs (2.5 mg) subcutaneously every 7 days for 30 days, THEN 1 mL (5 mg) every 7 days.  - Monthly follow up via Mychart and office visit every 3 months- Med check        Patient education and Handout with home care instructions given. See AVS for details.      Return in about 3 months (around 4/8/2025) for Follow up, Medication check.      Subjective   Merlyn is a 39 year old, presenting for the following health issues:  A.D.H.D and Weight Problem      1/8/2025     2:11 PM   Additional Questions   Roomed by Delmy   Accompanied by Self     A.D.H.D    History of Present Illness       Reason for visit:  ADHD  Symptom onset:  More than a month  Symptoms include:  Racing thoughts, inability to  finish tasks, forgetfulness, irriabilityvli  Symptom intensity:  Moderate  Symptom progression:  Staying the same  Had these symptoms before:  Yes  Has tried/received treatment for these symptoms:  No  What makes it worse:  Clutter in house (I feel paralyzed)   She is taking medications regularly.     9 year old son was recently diagnosed with ADHD. Started on medication.   Mom has noticed a huge positive transformation.   Has noticed that she may also be dealing with ADHD herself all her life and would like to proceed with evaluation and possibly meds.   Denies any prior Psychological evaluations.     Adult ADHD Screening tool by WHO - positive for 6 shaded areas.         Never Rarely Sometimes Often Very Often   1 How often do you have trouble wrapping up the final details of a project once the challenging parts have been done?    x    2 How often do you have difficulty getting things in order when you have to do a task that requires organization?   x     3 How often do you have problems remembering appointments or obligations?   x     4 When you have a task that requires a lot of thought, how often do you avoid or delay getting started?    x    5 How often do you fidget or squirm with your hands or feet when you have to sit down for a long time?     x   6 How often do you feel overly active and compelled to do things, like you were driven by a motor?    x        HYPERLIPIDEMIA - Patient has a long history of significant Hyperlipidemia requiring medication for treatment with recent poor control. Patient reports no problems or side effects with the medication- Atorvastatin 10 mg/day.  Last lipid panel-  Recent Labs   Lab Test 08/20/24  0821 06/21/23  0836   CHOL 194 232*   HDL 39* 41*   LDL 88 126*   TRIG 336* 324*      WEIGHT LOSS-   Patient wants to discuss weight loss medications.   Current weight-   Wt Readings from Last 4 Encounters:   01/08/25 119.5 kg (263 lb 6.4 oz)   11/05/24 119.7 kg (264 lb)   10/08/24  "117 kg (258 lb)   08/20/24 117.2 kg (258 lb 6.4 oz)     Body mass index is 38.57 kg/m .    How many servings of fruits and vegetables do you eat daily?  2-3  On average, how many sweetened beverages do you drink each day (Examples: soda, juice, sweet tea, etc.  Do NOT count diet or artificially sweetened beverages)?   0  How many days per week do you exercise enough to make your heart beat faster? 3 or less  How many minutes a day do you exercise enough to make your heart beat faster? 60 or more  How many days per week do you miss taking your medication? 0    Patient Active Problem List   Diagnosis    Hypertriglyceridemia    Other acne    Morbid obesity (H)    IUD (intrauterine device) in place     No past surgical history on file.    Social History     Tobacco Use    Smoking status: Never     Passive exposure: Never    Smokeless tobacco: Never   Substance Use Topics    Alcohol use: Yes     Comment: very little     Family History   Problem Relation Age of Onset    Hyperlipidemia Mother     Diabetes Father     No Known Problems Brother     No Known Problems Sister          Current Outpatient Medications   Medication Sig Dispense Refill    atorvastatin (LIPITOR) 20 MG tablet Take 1 tablet (20 mg) by mouth daily. 90 tablet 3    levonorgestrel (MIRENA) 52 MG (20 mcg/day) IUD 1 each by Intrauterine route once.      omeprazole (PRILOSEC) 20 MG DR capsule TAKE 1 CAPSULE BY MOUTH EVERY DAY 90 capsule 1    sertraline (ZOLOFT) 100 MG tablet Take 1 tablet (100 mg) by mouth daily 90 tablet 1    Tirzepatide 2.5 MG/0.5ML SOAJ Inject 0.5 mLs (2.5 mg) subcutaneously every 7 days for 30 days, THEN 1 mL (5 mg) every 7 days. 6 mL 0     Allergies   Allergen Reactions    Ragweeds            Review of Systems  Constitutional, HEENT, cardiovascular, pulmonary, gi and gu systems are negative, except as otherwise noted.      Objective    /78   Pulse 86   Temp 97.7  F (36.5  C) (Temporal)   Resp 18   Ht 1.76 m (5' 9.29\")   Wt " 119.5 kg (263 lb 6.4 oz)   LMP  (LMP Unknown)   SpO2 100%   BMI 38.57 kg/m    Body mass index is 38.57 kg/m .  Physical Exam   GENERAL: alert and no distress  EYES: Eyes grossly normal to inspection.  No discharge or erythema, or obvious scleral/conjunctival abnormalities.  RESP: No audible wheeze, cough, or visible cyanosis.    SKIN: Visible skin clear. No significant rash, abnormal pigmentation or lesions.  NEURO: Cranial nerves grossly intact.  Mentation and speech appropriate for age.  PSYCH: Appropriate affect, tone, and pace of words      DATA  Previous labs reviewed and discussed with patient.         Signed Electronically by: Madelin Burrell MD

## 2025-01-08 NOTE — PATIENT INSTRUCTIONS
Discussed healthy diet and exercise guidelines  Weight loss goals:   Prevent further weight gain   Aim to lose at least 1- 2 lbs/week.   Eat a well balanced heart healthy diet, cut out soda/sugary drinks and control portion sizes.   Avoid missing breakfast.  Exercise regularly; increase exercise gradually as tolerated to a goal of  30-45 minutes/5 days a week.

## 2025-01-09 ENCOUNTER — TELEPHONE (OUTPATIENT)
Dept: FAMILY MEDICINE | Facility: CLINIC | Age: 40
End: 2025-01-09
Payer: COMMERCIAL

## 2025-01-09 DIAGNOSIS — E66.09 CLASS 2 OBESITY DUE TO EXCESS CALORIES WITHOUT SERIOUS COMORBIDITY WITH BODY MASS INDEX (BMI) OF 38.0 TO 38.9 IN ADULT: ICD-10-CM

## 2025-01-09 DIAGNOSIS — E66.812 CLASS 2 OBESITY DUE TO EXCESS CALORIES WITHOUT SERIOUS COMORBIDITY WITH BODY MASS INDEX (BMI) OF 38.0 TO 38.9 IN ADULT: ICD-10-CM

## 2025-01-09 NOTE — TELEPHONE ENCOUNTER
PRIOR AUTHORIZATION DENIED    Medication: TIRZEPATIDE 2.5 MG/0.5ML SC SOAJ  Insurance Company: [employer] - Phone [phone] Fax [phone]  Denial Date: 1/9/2025  Denial Reason(s):     Appeal Information:       Patient Notified: No

## 2025-01-13 RX ORDER — TIRZEPATIDE 2.5 MG/.5ML
INJECTION, SOLUTION SUBCUTANEOUS
Refills: 0 | OUTPATIENT
Start: 2025-01-13

## 2025-01-13 NOTE — TELEPHONE ENCOUNTER
Noted.   Please let patient of message below- PA denied. Her plan only covers it for Type 2 diabetes.   Patient may reach out to her insurance or may need to pay out of pocket. Thanks

## 2025-01-13 NOTE — TELEPHONE ENCOUNTER
RN called pharmacy they confirmed they received new script and were able to run in through insurance w/ $50 copay.     They will notify patient when this is ready for        Mary Krishna RN on 1/13/2025 at 2:12 PM

## 2025-01-13 NOTE — TELEPHONE ENCOUNTER
"This is regarding tirzepatide 2.5 mg; I see the current Rx indicates the plan is to increase to 5 mg after one month at 2.5 mg.    I called and spoke to pharmacy, they need new Rx sent for the higher dose, they won't know if insurance will cover until they get the request and \"run\" it, but Rx is NOT covered if increasing the dose with the current product.    Lc'd new Rx and routed to Dr. Jalloh to address.    Adrienne JONES RN  [facility] Triage    "

## 2025-01-13 NOTE — TELEPHONE ENCOUNTER
LVM to inform of PA denial- see below note       Henny Rebollar  Lead    [facility] Avani Melendez

## 2025-01-29 ENCOUNTER — THERAPY VISIT (OUTPATIENT)
Dept: PHYSICAL THERAPY | Facility: CLINIC | Age: 40
End: 2025-01-29
Payer: COMMERCIAL

## 2025-01-29 DIAGNOSIS — M25.551 RIGHT HIP PAIN: Primary | ICD-10-CM

## 2025-01-29 PROCEDURE — 97110 THERAPEUTIC EXERCISES: CPT | Mod: GP | Performed by: PHYSICAL THERAPIST

## 2025-01-29 PROCEDURE — 97140 MANUAL THERAPY 1/> REGIONS: CPT | Mod: GP | Performed by: PHYSICAL THERAPIST

## 2025-01-29 PROCEDURE — 97139 UNLISTED THERAPEUTIC PX: CPT | Mod: GP | Performed by: PHYSICAL THERAPIST

## 2025-02-05 ENCOUNTER — THERAPY VISIT (OUTPATIENT)
Dept: PHYSICAL THERAPY | Facility: CLINIC | Age: 40
End: 2025-02-05
Payer: COMMERCIAL

## 2025-02-05 DIAGNOSIS — M25.551 RIGHT HIP PAIN: Primary | ICD-10-CM

## 2025-02-05 PROCEDURE — 97110 THERAPEUTIC EXERCISES: CPT | Mod: GP | Performed by: PHYSICAL THERAPIST

## 2025-02-05 PROCEDURE — 97139 UNLISTED THERAPEUTIC PX: CPT | Mod: GP | Performed by: PHYSICAL THERAPIST

## 2025-02-12 ENCOUNTER — THERAPY VISIT (OUTPATIENT)
Dept: PHYSICAL THERAPY | Facility: CLINIC | Age: 40
End: 2025-02-12
Payer: COMMERCIAL

## 2025-02-12 DIAGNOSIS — M25.551 RIGHT HIP PAIN: Primary | ICD-10-CM

## 2025-02-12 PROCEDURE — 97140 MANUAL THERAPY 1/> REGIONS: CPT | Mod: GP | Performed by: PHYSICAL THERAPIST

## 2025-02-12 PROCEDURE — 97139 UNLISTED THERAPEUTIC PX: CPT | Mod: GP | Performed by: PHYSICAL THERAPIST

## 2025-02-12 PROCEDURE — 97110 THERAPEUTIC EXERCISES: CPT | Mod: GP | Performed by: PHYSICAL THERAPIST

## 2025-02-19 ENCOUNTER — THERAPY VISIT (OUTPATIENT)
Dept: PHYSICAL THERAPY | Facility: CLINIC | Age: 40
End: 2025-02-19
Payer: COMMERCIAL

## 2025-02-19 DIAGNOSIS — M25.551 RIGHT HIP PAIN: Primary | ICD-10-CM

## 2025-02-19 PROCEDURE — 97139 UNLISTED THERAPEUTIC PX: CPT | Mod: GP | Performed by: PHYSICAL THERAPIST

## 2025-02-19 PROCEDURE — 97140 MANUAL THERAPY 1/> REGIONS: CPT | Mod: GP | Performed by: PHYSICAL THERAPIST

## 2025-02-27 ENCOUNTER — VIRTUAL VISIT (OUTPATIENT)
Facility: CLINIC | Age: 40
End: 2025-02-27
Attending: FAMILY MEDICINE
Payer: COMMERCIAL

## 2025-02-27 DIAGNOSIS — Z13.39 ADHD (ATTENTION DEFICIT HYPERACTIVITY DISORDER) EVALUATION: ICD-10-CM

## 2025-02-27 DIAGNOSIS — F41.1 GENERALIZED ANXIETY DISORDER: Primary | ICD-10-CM

## 2025-02-27 ASSESSMENT — COLUMBIA-SUICIDE SEVERITY RATING SCALE - C-SSRS
1. IN THE PAST MONTH, HAVE YOU WISHED YOU WERE DEAD OR WISHED YOU COULD GO TO SLEEP AND NOT WAKE UP?: NO
2. HAVE YOU ACTUALLY HAD ANY THOUGHTS OF KILLING YOURSELF?: NO
1. HAVE YOU WISHED YOU WERE DEAD OR WISHED YOU COULD GO TO SLEEP AND NOT WAKE UP?: YES

## 2025-02-27 ASSESSMENT — ANXIETY QUESTIONNAIRES
2. NOT BEING ABLE TO STOP OR CONTROL WORRYING: SEVERAL DAYS
1. FEELING NERVOUS, ANXIOUS, OR ON EDGE: SEVERAL DAYS
7. FEELING AFRAID AS IF SOMETHING AWFUL MIGHT HAPPEN: NOT AT ALL
GAD7 TOTAL SCORE: 6
GAD7 TOTAL SCORE: 6
6. BECOMING EASILY ANNOYED OR IRRITABLE: MORE THAN HALF THE DAYS
5. BEING SO RESTLESS THAT IT IS HARD TO SIT STILL: SEVERAL DAYS
3. WORRYING TOO MUCH ABOUT DIFFERENT THINGS: SEVERAL DAYS
IF YOU CHECKED OFF ANY PROBLEMS ON THIS QUESTIONNAIRE, HOW DIFFICULT HAVE THESE PROBLEMS MADE IT FOR YOU TO DO YOUR WORK, TAKE CARE OF THINGS AT HOME, OR GET ALONG WITH OTHER PEOPLE: NOT DIFFICULT AT ALL

## 2025-02-27 ASSESSMENT — PATIENT HEALTH QUESTIONNAIRE - PHQ9
SUM OF ALL RESPONSES TO PHQ QUESTIONS 1-9: 9
SUM OF ALL RESPONSES TO PHQ QUESTIONS 1-9: 9
5. POOR APPETITE OR OVEREATING: NOT AT ALL
10. IF YOU CHECKED OFF ANY PROBLEMS, HOW DIFFICULT HAVE THESE PROBLEMS MADE IT FOR YOU TO DO YOUR WORK, TAKE CARE OF THINGS AT HOME, OR GET ALONG WITH OTHER PEOPLE: SOMEWHAT DIFFICULT

## 2025-02-27 NOTE — PROGRESS NOTES
[facility]   Mental Health & Addiction Services     Progress Note - Initial Visit    Client Name:  Merlyn Markham Date: 2025         Service Type: Individual     Visit Start Time: 9:12 am  Visit End Time: 9:55 am    Visit #: 1    Attendees: Client attended alone    Service Modality:  Video Visit:      Provider verified identity through the following two step process.  Patient provided:  Patient  and Patient address    Telemedicine Visit: The patient's condition can be safely assessed and treated via synchronous audio and visual telemedicine encounter.      Reason for Telemedicine Visit: Patient convenience (e.g. access to timely appointments / distance to available provider)    Originating Site (Patient Location): Patient's home    Distant Site (Provider Location): [facility]    Consent:  The patient/guardian has verbally consented to: the potential risks and benefits of telemedicine (video visit) versus in person care; bill my insurance or make self-payment for services provided; and responsibility for payment of non-covered services.     Patient would like the video invitation sent by:  Send to e-mail at: [email or web address]    Mode of Communication:  Video Conference via [facility]    Distant Location (Provider):  On-site    As the provider I attest to compliance with applicable laws and regulations related to telemedicine.       DATA:  Extended Session (53+ minutes): No  Interactive Complexity: No   Crisis: No     Presenting Concerns/Current Stressors:   Patient presented to session to initiate the ADHD evaluation process.           2023     4:52 PM 2024     1:33 PM 2025     8:41 AM   PHQ   PHQ-9 Total Score 3 5 9    Q9: Thoughts of better off dead/self-harm past 2 weeks Not at all Not at all Not at all       Patient-reported            2023     4:52 PM 2024     1:33 PM  2/27/2025     9:38 AM   CLARE-7 SCORE   Total Score 5 1 6       ASSESSMENT:  Mental Status Assessment:  Appearance:   Appropriate   Eye Contact:   Good   Psychomotor Behavior: Normal   Attitude:   Cooperative   Orientation:   All  Speech   Rate / Production: Pressured    Volume:  Loud   Mood:    Stable  Affect:    Appropriate   Thought Content:  Clear   Thought Form:  Coherent   Insight:    Good       Safety Issues and Plan for Safety and Risk Management:   [place] Suicide Severity Rating Scale (Lifetime/Recent)      2/27/2025     9:42 AM   [place] Suicide Severity Rating (Lifetime/Recent)   1. Wish to be Dead (Lifetime) Y   Wish to be Dead Description (Lifetime) Passive SI - felt like a terrible mother during Covid-19 pandemic   1. Wish to be Dead (Past 1 Month) N   2. Non-Specific Active Suicidal Thoughts (Lifetime) N   Has subject engaged in non-suicidal self-injurious behavior? (Lifetime) N   Calculated C-SSRS Risk Score (Lifetime/Recent) No Risk Indicated     Patient denies current fears or concerns for personal safety.  Patient denies current or recent suicidal ideation or behaviors.  Patient denies current or recent homicidal ideation or behaviors.  Patient denies current or recent self injurious behavior or ideation.  Patient denies other safety concerns.  Recommended that patient call 911 or go to the local ED should there be a change in any of these risk factors   Patient reports there are no firearms in the house.    Diagnostic Criteria:  A. Excessive anxiety and worry, occurring more days than not for at least 6 months about a number of events or activities.   B. The individual finds it difficult to control the worry.  C. The anxiety and worry are associated with 3 or more of 6 symptoms.  D. The anxiety, worry, or physical symptoms cause clinically significant distress or impairment in social, occupational, or other important areas of functioning.  E. The disturbance is not attributable to the  physiological effects of a substance (e.g., a drug of abuse, a medication) or another medical condition (e.g., hyperthyroidism).  F. The disturbance is not better explained by another mental disorder (e.g., anxiety or worry about having panic attacks in panic disorder, negative evaluation in social anxiety disorder [social phobia], contamination or other obsessions in obsessive-compulsive disorder, separation from attachment figures in separation anxiety disorder, reminders of traumatic events in posttraumatic stress disorder, gaining weight in anorexia nervosa, physical complaints in somatic symptom disorder, perceived appearance flaws in body dysmorphic disorder, having a serious illness in illness anxiety disorder, or the content of delusional beliefs in schizophrenia or delusional disorder).       DSM5 Diagnoses: (Sustained by DSM5 Criteria Listed Above)  Diagnoses:   1. Generalized anxiety disorder    2. ADHD (attention deficit hyperactivity disorder) evaluation      Psychosocial & Contextual Factors: mental health hx,       PROMIS-10 Scores        2/27/2025     8:52 AM   PROMIS-10 Total Score w/o Sub Scores   PROMIS TOTAL - SUBSCORES 22        Patient-reported             Intervention:              Reviewed symptoms and history of presenting concern. Patient endorsed symptoms consistent with depression , anxiety , OCD, and ADHD. Patient denied symptoms associated with michael, panic, trauma, Autism, perceptual difficulties, and disordered eating. Unable to complete diagnostic intake, will be completed in next session. Offered suggestions about behavioral changes to assist with concentration.    CBT: socratic questioning, positive reinforcement  EFT: empathetic attunement, emotion checking, emotion naming  MI: open ended questions, affirmations, reflections        Attendance Agreement:  Client has not signed the attendance agreement. Discussed expectations at beginning of this first session and  patient agreed.       PLAN:  Provider will continue Diagnostic Assessment in next session. Patient will complete Margareth questionnaires  and CNS Vital Signs prior to next session (3/06/2025).    Patient meets the following risk assessment and triage: Patient denied any current/recent/lifetime history of suicidal ideation and/or behaviors.  No safety plan indicated at this time.     Medical necessity criteria is warranted in order to: Measure psychological barriers and strengths to aid in treatment planning, including but not limited to the selection of treatment options when several different approaches may be indicated, to determine treatment prognosis and outcomes, or to identify reasons for poor response to treatment, Perform symptom measurement to objectively measure treatment effectiveness and/or determine the need to refer for pharmacological treatment or other medical evaluation (e.g., based on severity and chronicity of symptoms), and Evaluate primary symptoms of impaired attention and concentration that can occur in many neurological and psychiatric conditions.    Medical necessity for psychological assessment is warranted as a result of the following: (1) A specific clinical question is posed that relates to the condition/symptoms being addressed (2) The question cannot be adequately addressed by clinical interview and/or behavioral observation (3) Results of psychological testing are reasonably expected to provide an answer to the query (4) It is reasonably expected that the testing will provide information leading to a clearer diagnosis and/or guide treatment planning with an expectation of improved clinical outcome.    I acknowledge that, based upon current clinical information, the patient and I have reviewed and discussed issues pertaining to the purpose of therapy/testing, potential therapeutic goals, procedures, risks and benefits, and estimated duration of therapy/testing. Issues pertaining to  fees/insurance and confidentiality were also addressed with the patient, who indicated understanding and elected to continue with appointments. I will not be providing any experimental procedures and, if we agree that a change in clinical procedure would be more beneficial, I will obtain specific consent for that procedure or refer you to another provider who has expertise in that area.       Trixie Macario MA  Doctoral Psychology Intern  2/27/2025 at 9:57 AM

## 2025-03-06 ENCOUNTER — VIRTUAL VISIT (OUTPATIENT)
Facility: CLINIC | Age: 40
End: 2025-03-06
Payer: COMMERCIAL

## 2025-03-06 DIAGNOSIS — F41.1 GENERALIZED ANXIETY DISORDER: Primary | ICD-10-CM

## 2025-03-06 DIAGNOSIS — Z13.39 ADHD (ATTENTION DEFICIT HYPERACTIVITY DISORDER) EVALUATION: ICD-10-CM
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
## 2025-03-17 DIAGNOSIS — F43.23 ADJUSTMENT DISORDER WITH MIXED ANXIETY AND DEPRESSED MOOD: ICD-10-CM

## 2025-03-17 RX ORDER — SERTRALINE HYDROCHLORIDE 100 MG/1
100 TABLET, FILM COATED ORAL DAILY
Qty: 90 TABLET | Refills: 1 | Status: SHIPPED | OUTPATIENT
Start: 2025-03-17

## 2025-03-27 ENCOUNTER — VIRTUAL VISIT (OUTPATIENT)
Facility: CLINIC | Age: 40
End: 2025-03-27
Payer: COMMERCIAL

## 2025-03-27 DIAGNOSIS — F90.2 ADHD (ATTENTION DEFICIT HYPERACTIVITY DISORDER), COMBINED TYPE: Primary | ICD-10-CM

## 2025-03-27 DIAGNOSIS — F41.1 GENERALIZED ANXIETY DISORDER: ICD-10-CM

## 2025-03-27 NOTE — PROGRESS NOTES
"Patient: Merlyn Markham  YOB: 1985  MRN: [id number]  Date(s) of assessment: 2/27/2025 & 3/06/2025  Referral Source:   Reason for Referral: assessing reported deficits in executive functioning     IDENTIFYING INFORMATION AND BRIEF HISTORY OF PRESENTING PROBLEM: Merlyn is a 39-year-old female who presented to the initial diagnostic intake appointments on 2/27/2025 and 3/06/2025 (see diagnostic intake dated 3/06/2025 for more detailed background information) due to primary concerns with inattention and hyperactivity/impulsivity.     As a child, patient reported that she failed to complete assigned chores in the home environment, displayed argumentative or oppositional behaviors, and had problems managing temper with frequent emotional outbursts. Patient reported no difficulty with childhood peer relationships and described herself as talkative and friendly. Patient reported often referring to her as always hyper. Patient was always active in high school and had routine and structure. She reported engaging in disruptive classroom behavior. Currently, the patient reported experiencing the following symptoms:  Making careless mistakes/not paying attention to details (often making errors when sending emails)  Difficulty sustaining attention   Does not listen when spoken to directly (losing important documents)  Does not follow through with tasks (home/chores)  Difficulty organizing (reported worsened in adulthood)  Avoids/dislikes tasks that require sustained mental effort   Loses things often (important documents)  Is easily distracted   Is forgetful   Is fidgety (shaking legs and moving hands)  Feels the need to get up and move around often (struggles to sit through meetings)  Feels \"on the go\" (always needs to be productive)  Talks excessively (tangential speech)  Blurts out information   Often interrupts others     Mental Health History: Patient reported a history of anxiety symptoms that began in " childhood. She reported her anxiety attributed to perfectionistic attitudes. Patient has been on medication for her anxiety for approximately 7 years. The patient reported a history of depressive symptoms but reports symptoms as currently stable. The patient denied a history of manic symptoms, social anxiety, phobic responses, symptoms of obsessive-compulsive disorder, trauma, and perceptual difficulties. The patient denied issues with sexual compulsivity, gambling, and disordered eating. She reported receiving individual therapy in the past and is now currently in marital counseling. She denied any psychiatric hospitalizations and civil commitment.     Developmental History: The patient reported that she believes that she is the product of a full-term pregnancy and there were no complications during her mother's pregnancy or birth. The patient reported that she believes she met all of her developmental milestones on time. She denied a history of head injuries, learning disorders, and special educational programming. 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 Patient identified attention, concentration, and speech as learning problems.    Chemical Dependence/Substance Abuse History: The patient denied a family history of substance use concerns. The patient denied a history of chemical or substance abuse. She reports drinking alcohol occasionally, approximately once a month. She reported occasional cannabis use, primarily utilizing this substance in social settings. She reported daily caffeine use, approximately two cups of coffee/tea per day. The patient denied nicotine use, street drug use, and prescription drug misuse.     SOURCES OF DATA/ASSESSMENT: Review of medical and psychiatric records, consideration of behavioral observations during the testing (if applicable), and the results of the psychological tests are  all considered in the preparation of this psychological test report. It is important to note that test results comprise a hypothesis of the patient's mental health concerns and are not an independent or conclusive assessment. Test results are combined with the patient's available medical, psychological, behavioral data for an integrated interpretation and report. Due to virtual/remote administration, certain aspects of the assessment process were impacted, such as access to direct patient observation, and maintaining an environment conducive to testing. As such, external factors have the potential to affect the validity of data collected.    TESTS ADMINISTERED:  CNS Vital Signs Neurocognitive Battery  Margareth Adult ADHD Rating Scale-IV: Self and Other Reports (BAARS-IV)  Margareth Functional Impairment Scale: Self and Other Reports (BFIS)  Margareth Deficits in Executive Functioning Scale (BDEFS)  Generalized Anxiety Disorder Questionnaire (CLARE-7)  Patient Health Questionnaire- 9 (PHQ-9)  [place] Multiphasic Personality Inventory - 3 (MMPI - 3)     BEHAVIORAL OBSERVATIONS: The patient was pleasant and cooperative throughout all interview and explanation of testing process. The patient was oriented to person, place, and time. Mood was neutral. Eye contact was adequate, and speech was at normal rate and rhythm. Motor activity was appropriate. Due to virtual/remote administration, direct patient observation was not possible during the testing process, and it is unknown if the patient was able to maintain an environment conducive to testing. As such, external factors have the potential to affect the validity of data collected.     TEST RESULTS: Test results comprise a hypothesis of the patient's mental health concerns and are not an independent or conclusive assessment. Test results are combined with the patient's available medical, psychological, behavioral, and observational data for an integrated interpretation and  report.    CNS Vital Signs Neurocognitive Battery  The CNS Vital Signs Neurocognitive Battery is a remotely-administered assessment comprised of seven core subtests to individually measure the patient's verbal memory, visual memory, motor speed, psychomotor speed, reaction time, focus, ability to sustain attention and ability to adapt to changing rules and tasks.      Above average domain scores indicate a standard score (SS) greater than 109 or a Percentile Rank (CO) greater than 74, indicating a high functioning test subject. Average is a SS  or CO 25-74, indicating normal function. Low Average is a SS 80-89 or CO 9-24 indicating a slight deficit or impairment. Below Average is a SS 70-79 or CO 2-8, indicating a moderate level of deficit or impairment. Very Low is a SS less than 70 or a CO less than 2, indicating a deficit and impairment.  Validity Indicator denotes a guideline for representing the possibility of an invalid test or domain score, and can be influenced by patient understanding, effort, or other conditions.    The patient's results are detailed below:    Domain Standard Score Percentile Description Validity   Neurocognitive Index 76 5 Low Yes   Composite Memory Measure 81 10 Low Average Yes   Verbal Memory 83 13 Low Average Yes   Visual Memory 86 18 Low Average Yes   Psychomotor Speed 101 53 Average Yes   Reaction Time 57 1 Very Low Yes   Complex Attention 76 5 Low Yes   Cognitive Flexibility 64 1 Very Low Yes   Processing Speed 113 81 Above Average Yes   Executive Function 62 1 Very Low Yes   Reasoning 87 19 Low Average Yes   Working Memory 93 32 Average No   Sustained Attention  68 2 Very Low No   Simple Attention 91 27 Average Yes   Motor Speed 95 37 Average Yes     Neurocognitive Index (NCI): Measures an average score derived from the domain scores or a general assessment of the overall neurocognitive status of the patient. The patient's NCI score is 76, with a percentile of 5, and falls  within the low range.    Composite Memory: Measures how well subject can recognize, remember, and retrieve words and geometric figures, and is comprised of the Visual and Verbal Memory domains. The patient's Composite Memory score is 81, with a percentile of 10, and falls within the low average range.    Verbal Memory: Measures how well subject can recognize, remember, and retrieve words. The patient's Verbal Memory score is 83, with a percentile of 13, and falls within the low average range.    Visual Memory: Measures how well subject can recognize, remember and retrieve geometric figures. The patient's Visual Memory score is 86, with a percentile of 18, and falls within the low average range.    Psychomotor Speed: Measures how well a subject perceives, attends, responds to complex visual-perceptual information and performs simple fine motor coordination, and is comprised of the Motor Speed and Processing Speed indexes. The patient's Psychomotor Speed score is 101, with a percentile of 53, and falls within the average range.    Reaction Time: Measures how quickly the subject can react, in milliseconds, to a simple and increasingly complex direction set. The patient's Reaction Time score is 57, with a percentile of 1, and falls within the very low range.    Complex Attention: Measures the ability to track and respond to a variety of stimuli over lengthy periods of time and/or perform complex mental tasks requiring vigilance quickly and accurately. The patient's Complex Attention score is 76, with a percentile of 5, and falls within the low range.    Cognitive Flexibility: Measures how well subject is able to adapt to rapidly changing and increasingly complex set of directions and/or to manipulate the information. The patient's Cognitive Flexibility score is 64, with a percentile of 1, and falls within the very low range.    Processing Speed: Measures how well a subject recognizes and processes information i.e.,  perceiving, attending/responding to incoming information, motor speed, fine motor coordination, and visual-perceptual ability. The patient's Processing Speed score is 113, with a percentile of 81, and falls within the above average range.    Executive Function: Measures how well a subject recognizes rules, categories, and manages or navigates rapid decision making. The patient's Executive Function score is 62, with a percentile of 1, and falls within the very low range.    Reasoning: Measures how well a subject can perceive and understand the meaning of visual or abstract information and recognizing relationships between visual-abstract concepts. The patient's Reasoning score is 87, with a percentile of 19, and falls in the low average range.     Working Memory: Measures how well a subject can perceive and attend to symbols using short-term memory processes. Also measures the ability to carry out short-term memory tasks that support decision making, problem solving, planning, and execution. The patient's Working Memory score is 93, with a percentile of 32, and falls in the average range.    Sustained Attention: Measures how well a subject can direct and focus cognitive activity on specific stimuli. Also measures how well a subject can focus and complete task or activity, sequence action, and focus during complex thought. The patient's Sustained Attention score is 68, with a percentile of 2, and falls in the very low range.    Simple Attention: Measures the ability to track and respond to a single defined stimulus over lengthy periods of time while performing vigilance and response inhibition quickly and accurately to a simple task. The patient's Simple Attention score is 91, with a percentile of 27, and falls within the average range.    Motor Speed: Measure: Ability to perform simple movements to produce and satisfy an intention towards a manual action and goal. The patient's Motor Speed score is 95, with a  "percentile of 37, and falls within the average range.    Margareth Adult ADHD Rating Scale-IV: Self and Other Reports (BAARS-IV)  The BAARS-IV assesses for symptoms of ADHD that are experienced in one's daily life. This assessment measure includes self and collateral rating scales designed to provide information regarding current and childhood symptoms of ADHD including inattention, hyperactivity, and impulsivity. Self-report scores are reported as percentiles. Scores at the 76th-83rd percentile are considered marginal, scores at the 84th-92nd percentile are considered borderline, scores at the 93rd-95th percentile are considered mild, scores at the 96th-98th percentile are considered moderate, and those at the 99th percentile are considered severe. Collateral or \"other\" rating scales are reported as number of symptoms observed in comparison to those reported by the client. Norms and percentile scores are not available for collateral reports.     Current Symptoms Scale--Self Report:   Client completed the self-report inventory of current symptoms. The results indicate that the client's Total ADHD Score was 57 which places the patient in the 99+ percentile for overall ADHD symptoms. In addition, the client endorsed 6/9 (97th percentile) Inattention symptoms, 7/9 (98th percentile) Hyperactivity-Impulsivity symptoms, and 3/9 (88th percentile) Sluggish Cognitive Tempo symptoms. Client indicated that the reported symptoms have resulted in impaired functioning in home, work, and social relationships. Overall, the results suggest the client is experiencing moderate ADHD symptoms.     Current Symptoms Scale--Other Report:  Client's spouse completed the collateral report inventory of current symptoms. Based on the collateral contact's observation of symptoms, the client demonstrates 1/9 Inattention symptoms, 3/5 Hyperactivity symptoms, 4/4 Impulsivity symptoms, and 3/9 Sluggish Cognitive Tempo symptoms. The client's Total ADHD " Score was 41. The collateral contact indicated the client demonstrates impaired functioning in home, work, and social relationships.  The collateral- and self-report scores are significantly different. The collateral report indicates lower symptom severity.    Childhood Symptoms Scale--Self-Report:  Client completed the self-report inventory of childhood symptoms. The results indicate that the client's Total ADHD Score was 45 which places the patient in the 92nd percentile for overall ADHD symptoms in childhood. In addition, the client endorsed 0/9 (1-75th percentile) Inattention symptoms and 8/9 (98th percentile) Hyperactivity-Impulsivity symptoms. Client indicated that the reported symptoms resulted in impaired functioning in school, home, and social relationships. Overall, the results suggest the client experienced borderline symptoms of ADHD as a child.     Childhood Symptoms Scale--Other Report:  Client's mother completed the collateral report inventory of childhood symptoms. Based on the collateral contact's recollection of client's childhood symptoms, the client demonstrated 0/9 Inattention symptoms and 7/9 Hyperactivity-Impulsivity symptoms. The client's Total ADHD Score was 38. The collateral contact indicated the client demonstrates impaired functioning in school, home, and social relationships. The collateral- and self-report scores are significantly different. The collateral report indicates lower symptom severity.                          Margareth Functional Impairment Scale: Self and Other Reports (BFIS)  The BFIS is used to assess an individuals' psychosocial impairment in major life/daily activities that may be due to a mental health disorder. This assessment measure includes self and collateral rating scales. Self-report scores are reported as percentiles. Scores at the 76th-83rd percentile are considered marginal, scores at the 84th-92nd percentile are considered borderline, scores at the 93rd-95th  "percentile are considered mild, scores at the 96th-98th percentile are considered moderate, and those at the 99th percentile are considered severe. Collateral or \"other\" rating scales are reported as number of symptoms observed in comparison to those reported by the client. Norms and percentile scores are not available for collateral reports.     Results indicate the client identified impairment (scores at or greater than 93rd percentile) in the following areas:  home-chores, work, social-strangers, social-friends, community activities, marriage/cohabiting/dating, money management,  health maintenance and childrearing. The client's Mean Impairment Score was 5.80 (94th percentile) indicating the client is reporting mild impairment in functioning across domains. Client's  completed the collateral rating scale, which indicated discrepant results. The collateral contact's scores were generally lower than the client's report.     Margareth Deficits in Executive Functioning Scale (BDEFS)  The BDEFS is a measure used for evaluating dimensions of adult executive functioning in daily life. This assessment measure includes self and collateral rating scales. Self-report scores are reported as percentiles. Scores at the 76th-83rd percentile are considered marginal, scores at the 84th-92nd percentile are considered borderline, scores at the 93rd-95th percentile are considered mild, scores at the 96th-98th percentile are considered moderate, and those at the 99th percentile are considered severe. Collateral or \"other\" rating scales are reported as number of symptoms observed in comparison to those reported by the client. Norms and percentile scores are not available for collateral reports.     Results indicate the client's Total Executive Functioning Score was 194 (92nd percentile). The ADHD-Executive Functioning Index score was 25 (93rd percentile). These scores suggest the client has borderline deficits in executive " functioning. Results indicate the client identified significant deficits in the following areas: self-management to time (mild deficits), self-organization/problem-solving (no significant deficits), self-restraint (mild deficits), self-motivation (borderline deficits) and self-regulation of emotions (moderate deficits). Client's  completed the collateral rating scale, which indicated discrepant results. The collateral contact's scores were generally higher than the client's report.    Generalized Anxiety Disorder Questionnaire (CLARE-7)  This questionnaire is designed to assess for anxiety in adults. Based on the score, the patient is experiencing minimal symptoms of anxiety. Client identified the following symptoms of anxiety: worrying about many different things, being restless, and becoming easily annoyed or irritable.    Patient Health Questionnaire- 9 (PHQ-9)   This questionnaire is designed to assess for depression in adults. Based on the score, the patient is experiencing mild symptoms of depression. Client identified the following symptoms of depression: difficulty with sleep, feeling tired or having little energy, and poor appetite or overeating.    [place] Multiphasic Personality Inventory - 3 (MMPI-3)    The MMPI-3 was administered to evaluate current level of emotional distress. Validity profile indicates that the patient appears to have answered in a generally straightforward and consistent manner, and obtained results are considered to be reliable and valid in representing current psychological status. No items were omitted.      Somatic/Cognitive Dysfunction: Patient is not endorsing significant somatic/cognitive complaints.    Emotional Dysfunction: Patient reports getting upset easily, being impatient with others, being easily angered, and sometimes even being overcome by anger.    Thought Dysfunction: The patient's thought processes are likely rational and realistic.    Behavioral  Dysfunction: The patient reports a variety of behaviors associated with hypomanic activation, such as excitability, impulsivity, or elevated mood. She reports engaging in problematic impulsive behavior. This suggests poor impulse control and a possibility of hyperactive behavior. The patient indicates heightened excitation and energy levels. She reports engaging in physically or verbally aggressive behavior and may occasionally lose control.    Interpersonal Functioning: The patient would describe herself as having strong opinions, standing up for herself, and being assertive and direct. She reports little to no social avoidance and is likely socially dominant.    SUMMARY: Merlyn is a 39-year-old female who completed psychological testing remotely/virtually. Testing was requested to provide updated diagnostic clarification and necessary treatment recommendations.    Patient first completed a diagnostic interview in which mental health symptoms, ADHD symptoms, and background information was gathered. Patient self-reported 6 symptoms of inattention and 7 symptoms of hyperactivity/impulsivity and indicated that her abilities to function at work, home, and within social relationships are significantly impaired. Further, her self-reported symptoms on Margareth measures of ADHD symptoms were consistent with this information. Both her and her spouse reported to observe significant symptoms in her currently and her mother reported to observe significant symptoms in childhood. An objective measure of personality indicated that the patient is not currently experiencing any somatic/cognitive complaints. She reports getting upset easily, being impatient with others, being easily angered, and sometimes even being overcome by anger. The patient's thought processes are likely rational and realistic. She reports a variety of behaviors associated with hypomanic activation, such as excitability, impulsivity, or elevated mood. She  reports poor impulse control and a possibility of hyperactive behavior She reports engaging in physically or verbally aggressive behavior and may occasionally lose control. The patient would describe herself as having strong opinions, standing up for herself, and being assertive and direct. She reports little to no social avoidance and is likely socially dominant.    An objective measure of neurocognitive functioning was also administered. Results first indicated verbal memory to be scored in the low average range. The patient displayed difficulty recognizing target words from a word list in comparison to peers. Additionally, she displayed difficulty with correct hits after a delay. Visual memory was scored in the low average range. She displayed difficulty recognizing target shapes immediately and after a delay in comparison to peers. Motor functioning appears to be intact as motor speed and psychomotor speed were scored in the average range. Reasoning was scored in the low average range, as the patient demonstrated difficulty solving nonverbal puzzle matrices comparable to peers. Processing speed further scored to be in the above average range, suggesting the ability to scan and respond to visual stimuli comparable to peers. On the stroop test, the patient was unable to inhibit the salient and correct responses, additionally, she responded at a lower rate. On a test of shifting attention, the patient displayed difficulty adjusting her responses according to changing rules sets comparable to peers. On a test of continuous performance, the patient was able to francisco her attention and resist making impulsive mistakes comparable to peers. On a more complex continuous performance test that included one-back and two-back components, the patient was unable to sustain her attention across time and hold information in her mind for short-term manipulation comparable to peers.      On CNS Vital Signs, ADHD is associated with  significant deficits in attention, processing speed, and executive functioning capabilities. Although processing speed was above average, the patient demonstrated significant difficulties in attention and concentration as well as hyperactivity/impulsivity. It is important to note that two neurocognitive domains were not valid, indicative of clear difficulty when taking this assessment. Additionally, the patient endorsed a variety of current and childhood ADHD symptoms, primarily hyperactive/impulsive presentation. The patient meets criteria for ADHD, combined presentation as well as generalized anxiety disorder. It is likely that because the patient is also experiencing anxiety symptoms, these ADHD symptoms may be more disruptive. The presence of anxiety can cause difficulty in performing tasks that require careful attention as well as lead to poor concentration. Anxiety and worry can also hinder an individual's ability to focus on important tasks and remember important events. Additionally, symptoms of trauma can contribute to problems with attention and affect an individual's ability to successfully complete tasks. See recommendations below.    Referral Question Response: DSM-5 criteria for ADHD:   A. Symptom Count - Are there sufficient symptoms for the diagnosis? Yes, patient did endorse sufficient symptoms.   B. Onset - Were several symptoms present before 12 years of age? Yes, a significant number of symptoms reportedly began in elementary school.   C. Pervasiveness - Are several symptoms present in at least two settings? Yes, patient reported that symptoms are problematic at home, work, and within social relationships.   D. Impairment - Do symptoms interfere with or reduce the quality of functioning? Yes, patient is unable to complete daily tasks and maintain optimal attention.  E. Exclusions - Are symptoms better explained by another disorder or factor? No, symptoms are better explained by an organic basis  of inattention.     The patient meets the following DSM-5 criteria for generalized anxiety disorder:  A. Excessive anxiety and worry, occurring more days than not for at least 6 months about a number of events or activities.   B. The individual finds it difficult to control the worry.  C. The anxiety and worry are associated with 3 or more of 6 symptoms.  D. The anxiety, worry, or physical symptoms cause clinically significant distress or impairment in social, occupational, or other important areas of functioning.  E. The disturbance is not attributable to the physiological effects of a substance (e.g., a drug of abuse, a medication) or another medical condition (e.g., hyperthyroidism).  F. The disturbance is not better explained by another mental disorder.    DIAGNOSES:  F90.2 Attention-Deficit/Hyperactivity Disorder, combined presentation, moderate  F41.1 Generalized Anxiety Disorder    PLAN OF CARE:  Discuss the following with your primary care provider:  Consider a trial of a stimulant medication. This may help alleviate some of the patient's attentional symptoms.   Continue the use of psychotropic medication as prescribed by doctor. This may help alleviate some of the patient's mood symptoms.     Consider initiating individual psychotherapy to help alleviate mood symptoms. Research indicates that outcomes are best with both medication and therapy. You can call the [facility] line at [phone]. NETTIE Ware is recommended specifically because she works with individuals with ADHD.    RECOMMENDATIONS:  Due to the patient's reported attention, concentration, and mood difficulties, the following health/lifestyle changes when combined, can significantly improve symptoms:   Avoid simple carbohydrates at breakfast. Aim for only complex carbohydrates and lean protein for your morning meal.   Engage in aerobic exercise 3 times per week for 30 minutes, ensuring that your heart rate stays  within your training zone. Further, reading the book,  Forrest,  by Demian Gonsalez M.D. can help the patient understand the benefits of exercise on the brain.   Research suggest that taking a high-quality multi-vitamin and antioxidant (1/2 cup of blueberries) daily in conjunction with balanced nutrition can be helpful.  Aim for the high end of daily water intake: around 72 ounces per day.  Ensure regular meals and snacks to maintain optimal attention.    The following may be beneficial in managing some of the patient's attention and concentration difficulties:  Due to the patient's difficulties with attention and concentration, consider working in a completely distraction-free area while completing tasks. Workspaces should be completely clear except for the materials needed for the current task. Both visual and auditory distractions should be decreased as much as possible.  Considering decreased ability to focus and maintain attention, it is recommended that the patient take frequent breaks while completing tasks. This will help to maintain attention and effort. The patient may benefit from the use of a Mingle360 Timer. The timer works by using built-in break times. After working on a task consistently for 25 minutes, the timer reminds the user to take a five-minute break before continuing, etc. A Mingle360 timer can be downloaded as a free corazon to a phone or tablet.  Due to the patient's attentional and concentration symptoms, it is recommended to increase organization with the use of lists and calendars. Significantly increasing structure to the day and adhering to a set schedule can increase your ability to complete responsibilities, track deadline, etc. Breaking these tasks down into their component parts and recording them in a calendar/planner will likely be beneficial. Patient would benefit from setting feasible timelines for completion of activities. By establishing clear priorities for completing tasks, you can  "more likely complete the most important tasks first. The patient may also choose to elect to a friend or family member to help hold them accountable.    Avoid multitasking. Attempting to work on multiple tasks and projects the same increases the likelihood that an error will occur. Focus on one task at a time.    Due to the patient's difficulties with sleep, it recommended to engage in a relaxing activity up to the point of sleep. The patient may want to spend time reading, drawing/ coloring, practicing mindfulness, listening (not watching) to podcasts, music, etc., or try the Carweez corazon, which is free to download and may aid falling asleep more easily.    The patient may benefit from engaging in mindfulness practices. This may include breathing techniques, apps that provide guided meditation, or more interactive activities such as coloring.    Develop a \"coping skills jar/box.\" This entails designating a certain container to hold slips of paper with distraction technique ideas written on each slip of paper. Distraction techniques may include listening to a certain type of music, playing on game on your phone, doing a breathing exercise, spending time with a pet, calling a certain individual, looking at a magazine, working on a puzzle, etc. When feeling distressed, choose a slip of paper from the container and engage in that activity rather than focusing on the problem.    Patient may need to negotiate with their employer for more frequent breaks or be allowed to move around more than is typical.    See the attached tip sheet for more ideas as well as online and book resources.         "

## 2025-03-27 NOTE — PATIENT INSTRUCTIONS
Coping with Attention-Deficit/Hyperactivity Disorder (ADHD)     If you have ADHD, it can be hard to sit still, pay attention or control impulsive behavior. ADHD can cause problems at home, at school, at work and in social settings. But you can learn ways to control your symptoms. Below are some ideas for coping with the most common ADHD problems.      Memory, Focus, and Managing Time    Be mindful of time. Use a watch, phone, timer, alarm, PDA or computer--anything that keeps accurate time that you can see and hear at all times. Set more than one alarm to remind you how much time you have left for a task. Give yourself more time than you think you need. For important appointments or deadlines, set reminder alarms hours or days ahead of time.   Use a day planner. A day planner can help you manage time and remember responsibilities. Learning to use a planner is just like learning to use any tool--practice makes perfect.   Use lists. Lists and notes can help you keep track of regular tasks, projects, deadlines and appointments. If you decide to use a daily planner, keep all lists and notes inside of it. Use color codes for tasks so you know which ones are the most important.  Learn to say no and set boundaries. Do not take on too many projects or social engagements. Overbooking yourself can be overwhelming and can lead to missed commitments.  Repeat out loud the instructions you have been given. This will help you remember, as well as let others correct you if needed.     Organization    Create space. Ask yourself what you need on a daily basis. Then, find storage bins or closets for things you don't need every day. Have specific areas for things like keys, bills and other items that are easy to misplace. Throw away or donate things you don't need.   Deal with it now. You can avoid forgetfulness, clutter and procrastination by doing things right away, not some time in the future. This includes filing papers,  cleaning up messes, opening and responding to mail and returning phone calls.  Set up a filing system. Use dividers or separate file folders for different types of documents, such as medical records, receipts and pay stubs. Label and color-code your files so that you can quickly find what you need.   Break larger tasks into small, manageable pieces. Write down the steps needed to complete the task. Follow each step in order until the task is done. If needed, take small, timed breaks and return to finish the task.  Organize your work space. Use lists or planners to organize your day. Remove clutter from your desk. Label any storage bins. Reduce distraction as much as possible. Ideas include sitting facing the wall, closing your door or using a white noise machine.     Sitting Still    Move around as needed. Don't fight the urge to move around. If you need to fidget or stand up for a period of time to help maintain attention, then do so. But take care that you're not interrupting others. You may also find it helpful to squeeze a stress ball.  Be active in a useful way. Exercise can burn off extra energy, relieve stress, boost your mood and calm your mind. Meditation, yoga or nikole chi can help you better control your attention and impulses.  Stay healthy. Get enough sleep. Avoid caffeine late in the day. Exercise. Create a relaxing bedtime routine. Stick to a schedule or daily routine. Eat small meals throughout the day. Avoid sugar, eat fewer carbohydrates and eat more protein.      Close Relationships    Talk to your loved ones about ADHD. There are many resources available that can help your loved one understand ADHD. See the Resources section below.  Divide daily tasks based on each person's strengths. For example, if you have trouble with organization, you may not want to do financial planning tasks.  If you have trouble with focus, develop a sign that others in your life can use as a gentle reminder to pay  attention. The sign should be small but meaningful to you and the other person.  Improve communication. Use a dry erase board in a common area to help the whole family stay in touch better. Keep regular to-do lists and compare scheduled activities every day. Writing notes to each other is also very helpful.  Be an active listener and try not to interrupt. If you find your mind wandering when others are talking, mentally repeat their words so you can follow the conversation. Ask questions and ask people to repeat what they said if needed. Pay close attention to body language.   Organize your thoughts. If you need to have a serious conversation, write a list of the points you would like to make or the important ideas you want to talk about. This can help you stay focused and remember what you need to say.  Think before speaking. It can be hard to control your impulses when you feel strongly about something. Before saying whatever pops into your head, stop to ask yourself  Will this be helpful?  or  Will this help me get what I want?   Take charge of your life. Work to accept that some things are harder for you. Make a plan to address these troubles and seek the support you need.     Online Resources    ADD Warehouse. http://www.addwarehouse.com  Attitude - ADD & ADHD [place]. https://www.adhdmarNorthStar Systems International.com/   Attention Deficit Disorder Association. http://www.add.org  Children and Adults with Attention-Deficit/Hyperactivity Disorder (VARSHA). http://www.varsha.org/  Jennifer Pena.  33 Ways to Get Organized with Adult ADHD.  http://www.additudemag.com/adhd/article/729.html  National Resource Center on ADHD. http://www.gprc6pmwq.org/  Jazmyne Bae.  Daily Living Tips for Adult ADHD.  http://www.medicinenet.com/living_tips_adult_adhd_pictures_slideshow/article.htm  Hermilo Barroso and Grecia Cortes.  Help for Adult ADD / ADHD.  http://www.helpguide.org  You can also find many apps for your phone that can help you with  common ADHD struggles       Book Resources    Randy Yang. Understand Your Brain, Get More Done: The ADHD Executive Functions Workbook. (2012).  Niraj Mckeon and Demian Gonsalez. Delivered from Distraction. (2006).  Niraj Gonsalez. Driven to Distraction. (2011).  Shantel Pepper. ADD in the Workplace. (1997).  Shantel Pepper. ADD-Friendly Ways to Organize Your Life. (2002).  Shantel Pepper. Understanding Girls with ADHD. How They Feel and Why They Do What They Do. (2015).  Kristy Funes. The ADHD Effect on Marriage: Understand and Rebuild Your Relationship in Six Steps. (2010).  Kristy Funes. The Couple's Guide to Thriving with ADHD. (2014).  Doug Pillai. The Drummer and the [place]: A Guide to Transforming Adult ADHD. (2014).  Urbano Zuluaga. ADHD in Adults. (2008).  Urbano Zuluaga. Taking Charge of Adult ADHD. (2010).  Taryn Marin. A Radical Guide for Women with ADHD: Embrace Neurodiversity, Live Boldly, and Break Through   Barriers.  (2019).    Tip: If reading feels too much like homework, try reading books like cookbooks.   Go to the table of contents or index, find what you are interested in and start there.   Then look at something else. Before you know it, you will be done with one book and on to the next.   It's also OK to read several books at the same time. Enjoy your journey of self discovery!     Podcasts Resources    [employer] Podcasts - ADHD 365 and All Things ADHD. https://AngelPrime.org/podcasts/  Adult ADHD ADD Podcast. https://www.Agenus.Mimosa/adult-adhd-add-podcast  ADHD Experts Podcast. https://podcasts.Invisible [employer]inel.Mimosa/us/podcast/dxdu-xpjghld-frjstfw/xl337712720     ANILA Talks Resources    ADHD Sucks: But Not Really. https://www.youtube.com/watch?v=tCJnmjq1cJ7  Failing at Normal: An ADHD Success Story. https://www.youtube.com/watch?v=JiwZQNYlGQI  ADHD in Girls and Women  Ludivina Patterson. https://www.youClash Media Advertisingube.com/watch?v=sen9AkpD4Yl    Support  Groups    ADHD Adult Support Group  [facility], [address] [place] [address].  7:00 to 8:30 p.m., last Thursday of each month (except December).  Contact/RSVP: [email or web address]     ADHD Adult Support Group  [facility], [address] [place] [address].  Thursday 10:00 a.m. to 12:00 p.m. or 7:00 to 9:30 p.m.  Contact/RSVP: Patrick Prince. [phone] or [email or web address]      ADHD Adult Support Group for Spouses/Partners of adults with ADHD  [facility], [address] [place] [address].  Tuesday 12:00 to 2:00 p.m.   Contact/RSVP: Patrick Prince. [phone] or [email or web address]      [facility]

## 2025-03-27 NOTE — PROGRESS NOTES
[facility]   Mental Health & Addiction Services     Progress Note - ADHD Feedback Session     Patient Name: Merlyn Markham  Date: 2025       Service Type:  Individual       Session Start Time: 12:00 pm  Session End Time:  12:39 pm     Session Length: 39 minutes    Session #: 3    Attendees: Patient attended alone    Service Modality: Video Visit:      Provider verified identity through the following two step process.  Patient provided:  Patient  and Patient address    Telemedicine Visit: The patient's condition can be safely assessed and treated via synchronous audio and visual telemedicine encounter.      Reason for Telemedicine Visit: Patient convenience (e.g. access to timely appointments / distance to available provider)    Originating Site (Patient Location): Patient's home    Distant Site (Provider Location): [facility]    Consent:  The patient/guardian has verbally consented to: the potential risks and benefits of telemedicine (video visit) versus in person care; bill my insurance or make self-payment for services provided; and responsibility for payment of non-covered services.     Patient would like the video invitation sent by:  My Chart    Mode of Communication:  Video Conference via [facility]    Distant Location (Provider):  On-site    As the provider I attest to compliance with applicable laws and regulations related to telemedicine.          2023     4:52 PM 2024     1:33 PM 2025     8:41 AM   PHQ   PHQ-9 Total Score 3 5 9    Q9: Thoughts of better off dead/self-harm past 2 weeks Not at all Not at all Not at all       Patient-reported           2023     4:52 PM 2024     1:33 PM 2025     9:38 AM   CLARE-7 SCORE   Total Score 5 1 6         DATA      Progress Since Last Session (Related to Symptoms / Goals / Homework):   Symptoms:  Stable.    Homework: Completed.      Treatment Objective(s) Addressed in This Session:   Provided feedback on ADHD evaluation. Reviewed test results in depth. Plan of care and recommendations were discussed based on testing data. See full report attached on secondary note in this encounter.     Intervention:   Provided feedback to patient regarding testing results, diagnoses, and treatment recommendations. Test results are consistent with an ADHD diagnosis. Symptoms are not better explained by another mental health disorder. Personalized suggestions regarding symptoms were offered. Patient had the opportunity to ask questions; she expressed understanding.        ASSESSMENT: Current Emotional / Mental Status (status of significant symptoms):   Risk status (Self / Other harm or suicidal ideation)   Patient denies current fears or concerns for personal safety.   Patient denies current or recent suicidal ideation or behaviors.   Patient denies current or recent homicidal ideation or behaviors.   Patient denies current or recent self injurious behavior or ideation.   Patient denies other safety concerns.   Patient reports there has been no change in risk factors since their last session.     Patient reports there has been no change in protective factors since their last session.     Recommended that patient call 911 or go to the local ED should there be a change in any of these risk factors     Appearance:   Appropriate    Eye Contact:   Good    Psychomotor Behavior: Normal    Attitude:   Cooperative    Orientation:   All   Speech    Rate / Production: Normal     Volume:  Normal    Mood:    Normal   Affect:    Appropriate    Thought Content:  Clear    Thought Form:  Coherent  Logical    Insight:    Good      Medication Review:   No changes to current psychiatric medication(s)     Medication Compliance:   NA     Changes in Health Issues:   None reported     Chemical Use Review:   Substance Use: Chemical use reviewed, no  active concerns identified      Nicotine Use: No current tobacco use.      Diagnosis:  1. ADHD (attention deficit hyperactivity disorder), combined type    2. Generalized anxiety disorder          PLAN:   Recommendations are outlined in full evaluation report (attached to this encounter).   Patient indicated understanding and will contact the clinic if there are further questions.    Report routed to referring provider.    Parts of this documentation may have been completed using dictation software. Potential errors may result and are unintentional.       Trixie Macario  Doctoral Psychology Intern       Psychological Testing Services Summary       Testing Evaluation Services Base: 00500  (first 60 mins) Add-on: 99115  (each addtl 60 mins)   Record Review and Clarify Referral Question   8:50am-9:00am on 2/27/2025 10 minutes   Patient Symptom Management   9:00am-9:10am on 2/27/2025 10 minutes   Clinical Decision Making/Battery Modification   10:00am-10:15am on 2/27/2025 15 minutes   Integration/Report Generation   2:30pm-3:30pm on 3/06/2025 ([facility])  4:30pm-5:00pm on 3/06/2025 ([facility] Vital Signs)  8:00am-8:45am on 3/12/2025 (MMPI-3)  3:30pm-4:30pm on 3/24/2025 (Final Report)   60 minutes  30 minutes  45 minutes  60 minutes   Interactive Feedback Session   12:00pm-12:39pm on 3/27/2025 39 minutes   Post-Service Work   5:00pm-5:15pm on 3/27/2025 15 minutes   Total Time: 284 minutes   Total Units: 1 4       Diagnoses:   1. ADHD (attention deficit hyperactivity disorder), combined type    2. Generalized anxiety disorder

## 2025-03-27 NOTE — Clinical Note
Here are the results of your patient's ADHD evaluation. Thank you for the referral!  Trixie Palma MA Doctoral Psychology Intern 3/27/2025 at 5:38 PM

## 2025-04-01 ENCOUNTER — VIRTUAL VISIT (OUTPATIENT)
Dept: FAMILY MEDICINE | Facility: CLINIC | Age: 40
End: 2025-04-01
Payer: COMMERCIAL

## 2025-04-01 DIAGNOSIS — F41.1 GENERALIZED ANXIETY DISORDER: ICD-10-CM

## 2025-04-01 DIAGNOSIS — E66.01 MORBID OBESITY (H): ICD-10-CM

## 2025-04-01 DIAGNOSIS — F90.2 ADHD (ATTENTION DEFICIT HYPERACTIVITY DISORDER), COMBINED TYPE: Primary | ICD-10-CM

## 2025-04-01 PROCEDURE — 98006 SYNCH AUDIO-VIDEO EST MOD 30: CPT | Performed by: FAMILY MEDICINE

## 2025-04-01 RX ORDER — DEXTROAMPHETAMINE SACCHARATE, AMPHETAMINE ASPARTATE MONOHYDRATE, DEXTROAMPHETAMINE SULFATE AND AMPHETAMINE SULFATE 1.25; 1.25; 1.25; 1.25 MG/1; MG/1; MG/1; MG/1
5 CAPSULE, EXTENDED RELEASE ORAL DAILY
Qty: 30 CAPSULE | Refills: 0 | Status: SHIPPED | OUTPATIENT
Start: 2025-04-01

## 2025-04-01 NOTE — PROGRESS NOTES
Merlyn is a 39 year old who is being evaluated via a billable video visit.    How would you like to obtain your AVS? MyChart  If the video visit is dropped, the invitation should be resent by: Text to cell phone: [phone]  Will anyone else be joining your video visit? No      Assessment & Plan     Merlyn was seen today for medication follow-up.    Diagnoses and all orders for this visit:    ADHD (attention deficit hyperactivity disorder), combined type, newly diagnosed  -    Treatment options discussed. Patient would like to trial a stimulant.    Start: amphetamine-dextroamphetamine (ADDERALL XR) 5 MG 24 hr capsule; Take 1 capsule (5 mg) by mouth daily.  -     Re-evaluate in a month.     Generalized anxiety disorder,stable       -   Continue Sertraline       -   Consider a gradual dose reduction if ADHD symptoms improve    Morbid obesity (H)        -   Last prescribed Zepbound, not covered by insurance. Patient will check on weight loss medication coverage and let us know.         Return in about 1 month (around 5/1/2025) for Follow up, Medication check.        Subjective   Merlyn is a 39 year old, presenting for the following health issues:  Medication Follow-up        4/1/2025    11:31 AM   Additional Questions   Roomed by Elma LOWERY     History of Present Illness       Reason for visit:  ADHD evaluation medicine, follow up on weight loss medicine    She eats 2-3 servings of fruits and vegetables daily.She consumes 0 sweetened beverage(s) daily.She exercises with enough effort to increase her heart rate 10 to 19 minutes per day.  She exercises with enough effort to increase her heart rate 3 or less days per week.   She is taking medications regularly.      Patient completed a thorough Psychological evaluation on 3/27.    Report reviewed.     DIAGNOSES:  F90.2 Attention-Deficit/Hyperactivity Disorder, combined presentation, moderate  F41.1 Generalized Anxiety Disorder     PLAN OF CARE:  Discuss the following with  your primary care provider:  Consider a trial of a stimulant medication. This may help alleviate some of the patient's attentional symptoms.   Continue the use of psychotropic medication as prescribed by doctor. This may help alleviate some of the patient's mood symptoms.  Consider initiating individual psychotherapy to help alleviate mood symptoms. Research indicates that outcomes are best with both medication and therapy. You can call the  [facility] line at [phone]. 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       Patient states that she is ready to trial a stimulant for the ADHD symptoms.   On Sertraline for anxiety. Works as a Teacher.     Baseline ADHD questionnaire below.     Never Rarely Sometimes Often Very Often   1 How often do you have trouble wrapping up the final details of a project once the challenging parts have been done?    x    2 How often do you have difficulty getting things in order when you have to do a task that requires organization?  x      3 How often do you have problems remembering appointments or obligations?     x   4 When you have a task that requires a lot of thought, how often do you avoid or delay getting started?   x     5 How often do you fidget or squirm with your hands or feet when you have to sit down for a long time?     x   6 How often do you feel overly active and compelled to do things, like you were driven by a motor?    x    7 How often do you make careless mistakes when you have to work on a boring or difficult project?     x   8 How often do you have difficulty keeping your attention when you are doing boring or repetitive work?     x   9 How often do you have difficulty concentrating on what people say to you, even when they are speaking to you directly?   x     10 How often do you misplace or have difficulty finding things at home or at work?    x    11 How often are you distracted by  activity or noise around you?    x    12 How often do you leave your seat in meetings or other situations in which you are expected to remain seated?   x     13 How often do you feel restless or fidgety?     x   14 How often do you have difficulty unwinding and relaxing when you have time to yourself?   x     15 How often do you find yourself talking too much when you are in social situations?     x   16 When you're in a conversation, how often do you find yourself finishing the sentences of the people you are talking to, before they can finish them themselves?     x   17 How often do you have difficulty waiting your turn in situations when turn-taking is required?   x     18 How often do you interrupt others when they are busy?    x          Patient Active Problem List   Diagnosis    Hypertriglyceridemia    Other acne    Morbid obesity (H)    IUD (intrauterine device) in place     No past surgical history on file.    Social History     Tobacco Use    Smoking status: Never     Passive exposure: Never    Smokeless tobacco: Never   Substance Use Topics    Alcohol use: Yes     Comment: very little     Family History   Problem Relation Age of Onset    Hyperlipidemia Mother     Diabetes Father     No Known Problems Brother     No Known Problems Sister          Current Outpatient Medications   Medication Sig Dispense Refill    amphetamine-dextroamphetamine (ADDERALL XR) 5 MG 24 hr capsule Take 1 capsule (5 mg) by mouth daily. 30 capsule 0    atorvastatin (LIPITOR) 20 MG tablet Take 1 tablet (20 mg) by mouth daily. 90 tablet 3    levonorgestrel (MIRENA) 52 MG (20 mcg/day) IUD 1 each by Intrauterine route once.      omeprazole (PRILOSEC) 20 MG DR capsule TAKE 1 CAPSULE BY MOUTH EVERY DAY 90 capsule 1    sertraline (ZOLOFT) 100 MG tablet TAKE 1 TABLET BY MOUTH EVERY DAY 90 tablet 1    tirzepatide-Weight Management (ZEPBOUND) 5 MG/0.5ML prefilled pen Inject 0.5 mLs (5 mg) subcutaneously every 7 days. 3 mL 0     Allergies    Allergen Reactions    Ragweeds          Review of Systems  Constitutional, HEENT, cardiovascular, pulmonary, gi and gu systems are negative, except as otherwise noted.      Objective           Vitals:  No vitals were obtained today due to virtual visit.    Physical Exam   GENERAL: alert and no distress  EYES: Eyes grossly normal to inspection.  No discharge or erythema, or obvious scleral/conjunctival abnormalities.  RESP: No audible wheeze, cough, or visible cyanosis.    SKIN: Visible skin clear. No significant rash, abnormal pigmentation or lesions.  NEURO: Cranial nerves grossly intact.  Mentation and speech appropriate for age.  PSYCH: Appropriate affect, tone, and pace of words          Video-Visit Details    Type of service:  Video Visit   Originating Location (pt. Location): Home  Distant Location (provider location):  On-site  Platform used for Video Visit: Anneliese  Signed Electronically by: Madelin Burrell MD

## 2025-04-02 ENCOUNTER — MYC MEDICAL ADVICE (OUTPATIENT)
Dept: FAMILY MEDICINE | Facility: CLINIC | Age: 40
End: 2025-04-02
Payer: COMMERCIAL

## 2025-04-02 DIAGNOSIS — E66.812 CLASS 2 OBESITY DUE TO EXCESS CALORIES WITHOUT SERIOUS COMORBIDITY WITH BODY MASS INDEX (BMI) OF 38.0 TO 38.9 IN ADULT: ICD-10-CM

## 2025-04-02 DIAGNOSIS — E66.09 CLASS 2 OBESITY DUE TO EXCESS CALORIES WITHOUT SERIOUS COMORBIDITY WITH BODY MASS INDEX (BMI) OF 38.0 TO 38.9 IN ADULT: ICD-10-CM

## 2025-04-04 NOTE — TELEPHONE ENCOUNTER
Disp Refills Start End MOHINI    tirzepatide-Weight Management (ZEPBOUND) 5 MG/0.5ML prefilled pen 3 mL 0 1/13/2025 -- No   Sig - Route: Inject 0.5 mLs (5 mg) subcutaneously every 7 days. - Subcutaneous     Pharmacy    [id number] - [place] - [address] [place] [address] AT [address]       Haley Ricci RN on 4/4/2025 at 8:00 AM

## 2025-04-16 DIAGNOSIS — R49.0 VOICE HOARSENESS: ICD-10-CM

## 2025-04-17 RX ORDER — OMEPRAZOLE 20 MG/1
20 CAPSULE, DELAYED RELEASE ORAL DAILY
Qty: 90 CAPSULE | Refills: 1 | Status: SHIPPED | OUTPATIENT
Start: 2025-04-17

## 2025-05-23 ENCOUNTER — MYC REFILL (OUTPATIENT)
Dept: FAMILY MEDICINE | Facility: CLINIC | Age: 40
End: 2025-05-23
Payer: COMMERCIAL

## 2025-05-23 DIAGNOSIS — E66.09 CLASS 2 OBESITY DUE TO EXCESS CALORIES WITHOUT SERIOUS COMORBIDITY WITH BODY MASS INDEX (BMI) OF 38.0 TO 38.9 IN ADULT: ICD-10-CM

## 2025-05-23 DIAGNOSIS — E66.812 CLASS 2 OBESITY DUE TO EXCESS CALORIES WITHOUT SERIOUS COMORBIDITY WITH BODY MASS INDEX (BMI) OF 38.0 TO 38.9 IN ADULT: ICD-10-CM

## 2025-05-28 NOTE — TELEPHONE ENCOUNTER
Please check in with patient if she's tolerating current Zepbound dose and if ready for next dose up.     She will need an inperson follow up visit in a month before next refill request- please help her schedule. OK to use NAKUL slot. Thanks

## 2025-05-29 NOTE — TELEPHONE ENCOUNTER
Called patient.  She stated that she was slightly nauseated, when starting the 7.5 mg dosage, but this subsided.  She has not had any nausea in the last 3 weeks.    Patient stated that she would like to stay at the same dosage for now.    She stated that she will call back for an appointment..      Anna BOWIE RN  Triage Nurse  [facility]

## 2025-06-11 ENCOUNTER — OFFICE VISIT (OUTPATIENT)
Dept: FAMILY MEDICINE | Facility: CLINIC | Age: 40
End: 2025-06-11
Payer: COMMERCIAL

## 2025-06-11 VITALS
DIASTOLIC BLOOD PRESSURE: 64 MMHG | BODY MASS INDEX: 35.32 KG/M2 | HEIGHT: 69 IN | WEIGHT: 238.5 LBS | HEART RATE: 82 BPM | TEMPERATURE: 97.2 F | SYSTOLIC BLOOD PRESSURE: 108 MMHG | RESPIRATION RATE: 18 BRPM | OXYGEN SATURATION: 99 %

## 2025-06-11 DIAGNOSIS — Z79.899 CONTROLLED SUBSTANCE AGREEMENT SIGNED: ICD-10-CM

## 2025-06-11 DIAGNOSIS — E66.09 CLASS 2 OBESITY DUE TO EXCESS CALORIES WITHOUT SERIOUS COMORBIDITY WITH BODY MASS INDEX (BMI) OF 38.0 TO 38.9 IN ADULT: Primary | ICD-10-CM

## 2025-06-11 DIAGNOSIS — E66.812 CLASS 2 OBESITY DUE TO EXCESS CALORIES WITHOUT SERIOUS COMORBIDITY WITH BODY MASS INDEX (BMI) OF 38.0 TO 38.9 IN ADULT: Primary | ICD-10-CM

## 2025-06-11 DIAGNOSIS — F90.2 ADHD (ATTENTION DEFICIT HYPERACTIVITY DISORDER), COMBINED TYPE: ICD-10-CM

## 2025-06-11 PROCEDURE — 99214 OFFICE O/P EST MOD 30 MIN: CPT | Performed by: FAMILY MEDICINE

## 2025-06-11 RX ORDER — DEXTROAMPHETAMINE SACCHARATE, AMPHETAMINE ASPARTATE MONOHYDRATE, DEXTROAMPHETAMINE SULFATE AND AMPHETAMINE SULFATE 2.5; 2.5; 2.5; 2.5 MG/1; MG/1; MG/1; MG/1
10 CAPSULE, EXTENDED RELEASE ORAL DAILY
Qty: 90 CAPSULE | Refills: 0 | Status: SHIPPED | OUTPATIENT
Start: 2025-06-11

## 2025-06-11 NOTE — LETTER
[facility] ANI  06/11/25  Patient: Merlyn Markham  YOB: 1985  Medical Record Number: [id number]                                                                                  Non-Opioid Controlled Substance Agreement    This is an agreement between you and your provider regarding safe and appropriate use of controlled substances prescribed by your care team. Controlled substances are?medicines that can cause physical and mental dependence (abuse).     There are strict laws about having and using these medicines. We here at [facility] are  committed to working with you in your efforts to get better. To support you in this work, we'll help you schedule regular office appointments for medicine refills. If we must cancel or change your appointment for any reason, we'll make sure you have enough medicine to last until your next appointment.     As a Provider, I will:   Listen carefully to your concerns while treating you with respect.   Recommend a treatment plan that I believe is in your best interest and may involve therapies other than medicine.    Talk with you often about the possible benefits and the risk of harm of any medicine that we prescribe for you.  Assess the safety of this medicine and check how well it works.    Provide a plan on how to taper (discontinue or go off) using this medicine if the decision is made to stop its use.      ::  As a Patient, I understand controlled substances:     Are prescribed by my care provider to help me function or work and manage my condition(s).?  Are strong medicines and can cause serious side effects.     Need to be taken exactly as prescribed.?Combining controlled substances with certain medicines or chemicals (such as illegal drugs, alcohol, sedatives, sleeping pills, and benzodiazepines) can be dangerous or even fatal.? If I stop taking my medicines suddenly, I may have severe withdrawal symptoms.     The risks, benefits, and  side effects of these medicine(s) were explained to me. I agree that:    I will take part in other treatments as advised by my care team. This may be psychiatry or counseling, physical therapy, behavioral therapy, group treatment or a referral to specialist.    I will keep all my appointments and understand this is part of the monitoring of controlled substances.?My care team may require an office visit for EVERY controlled substance refill. If I miss appointments or don t follow instructions, my care team may stop my medicine    I will take my medicines as prescribed. I will not change the dose or schedule unless my care team tells me to. There will be no refills if I run out early.      I may be asked to come to the clinic and complete a urine drug test or complete a pill count. If I don t give a urine sample or participate in a pill count, the care team may stop my medicine.    I will only receive controlled substance prescriptions from this clinic. If I am treated by another provider, I will tell them that I am taking controlled substances and that I have a treatment agreement with this provider. I will inform my [facility] care team within one business day if I am given a prescription for any controlled substance by another healthcare provider. My [facility] care team can contact other providers and pharmacists about my use of any medicines.    It is up to me to make sure that I don't run out of my medicines on weekends or holidays.?If my care team is willing to refill my prescription without a visit, I must request refills only during office hours. Refills may take up to 3 business days to process. I will use one pharmacy to fill all my controlled substance prescriptions. I will notify the clinic about any changes to my insurance or medicine availability.    I am responsible for my prescriptions. If the medicine/prescription is lost, stolen or destroyed, it will not be replaced.?I also agree not  to share controlled substance medicines with anyone.     I am aware I should not use any illegal or recreational drugs. I agree not to drink alcohol unless my care team says I can.     If I enroll in the [place] Medical Cannabis program, I will tell my care team before my next refill.    I will tell my care team right away if I become pregnant, have a new medical problem treated outside of my regular clinic, or have a change in my medicines.     I understand that this medicine can affect my thinking, judgment and reaction time.? Alcohol and drugs affect the brain and body, which can affect the safety of my driving. Being under the influence of alcohol or drugs can affect my decision-making, behaviors, personal safety and the safety of others. Driving while impaired (DWI) can occur if a person is driving, operating or in physical control of a car, motorcycle, boat, snowmobile, ATV, motorbike, off-road vehicle or any other motor vehicle ([place] Statute 169A.20). I understand the risk if I choose to drive or operate any vehicle or machinery.    I understand that if I do not follow any of the conditions above, my prescriptions or treatment may be stopped or changed.   I agree that my provider, clinic care team and pharmacy may work with any city, state or federal law enforcement agency that investigates the misuse, sale or other diversion of my controlled medicine. I will allow my provider to discuss my care with, or share a copy of, this agreement with any other treating provider, pharmacy or emergency room where I receive care.     I have read this agreement and have asked questions about anything I did not understand.    ________________________________________________________  Patient Signature - Merlyn BEBE Velezhten     ___________________                   Date     ________________________________________________________  Provider Signature - Mdaelin Burrell MD       ___________________                   Date      ________________________________________________________  Witness Signature (required if provider not present while patient signing)          ___________________                   Date

## 2025-06-11 NOTE — PROGRESS NOTES
"  Assessment & Plan     Merlyn was seen today for weight loss medication .    Diagnoses and all orders for this visit:    Class 2 obesity due to excess calories without serious comorbidity with body mass index (BMI) of 38.0 to 38.9 in adult  -     Prefers to stay on current dose: Refill: tirzepatide-Weight Management (ZEPBOUND) 7.5 MG/0.5ML prefilled pen; Inject 0.5 mLs (7.5 mg) subcutaneously every 7 days.  -    Continue Weight management plan: Discussed healthy diet and exercise guidelines.      ADHD (attention deficit hyperactivity disorder), combined type, stable/controlled.  -     Refill: amphetamine-dextroamphetamine (ADDERALL XR) 10 MG 24 hr capsule; Take 1 capsule (10 mg) by mouth daily.    Controlled substance agreement signed        -   Agreement forms signed.         BMI  Estimated body mass index is 35.08 kg/m  as calculated from the following:    Height as of this encounter: 1.756 m (5' 9.13\").    Weight as of this encounter: 108.2 kg (238 lb 8 oz).   Weight management plan: Discussed healthy diet and exercise guidelines      Return in about 3 months (around 9/11/2025) for Follow up, Medication check.      Subjective   Merlyn is a 40 year old, presenting for the following health issues:  Weight loss medication         6/11/2025     9:13 AM   Additional Questions   Roomed by Delmy   Accompanied by Self     History of Present Illness       Reason for visit:  GLP injection check    She eats 2-3 servings of fruits and vegetables daily.She consumes 0 sweetened beverage(s) daily.She exercises with enough effort to increase her heart rate 10 to 19 minutes per day.  She exercises with enough effort to increase her heart rate 3 or less days per week.   She is taking medications regularly.        Weight loss journey-   Currently on Zepbound 7.5 mg/day. Tolerating well. Losing weight on this medication and prefers to stay on this dose. Requesting a refill.     Wt Readings from Last 4 Encounters:   06/11/25 108.2 kg " (238 lb 8 oz)   01/08/25 119.5 kg (263 lb 6.4 oz)   11/05/24 119.7 kg (264 lb)   10/08/24 117 kg (258 lb)       ADHD, combined type.   Currently on Adderall XR 10 mg/day.   Symptoms are much improved- see below    Less shaded areas noted.       Never Rarely Sometimes Often Very Often   1 How often do you have trouble wrapping up the final details of a project once the challenging parts have been done?   x     2 How often do you have difficulty getting things in order when you have to do a task that requires organization?  x      3 How often do you have problems remembering appointments or obligations?  x      4 When you have a task that requires a lot of thought, how often do you avoid or delay getting started?   x     5 How often do you fidget or squirm with your hands or feet when you have to sit down for a long time?    x    6 How often do you feel overly active and compelled to do things, like you were driven by a motor?   x     7 How often do you make careless mistakes when you have to work on a boring or difficult project?  x      8 How often do you have difficulty keeping your attention when you are doing boring or repetitive work?   x     9 How often do you have difficulty concentrating on what people say to you, even when they are speaking to you directly?  x      10 How often do you misplace or have difficulty finding things at home or at work?   x     11 How often are you distracted by activity or noise around you?   x     12 How often do you leave your seat in meetings or other situations in which you are expected to remain seated?  x      13 How often do you feel restless or fidgety?   x     14 How often do you have difficulty unwinding and relaxing when you have time to yourself? x       15 How often do you find yourself talking too much when you are in social situations?   x     16 When you're in a conversation, how often do you find yourself finishing the sentences of the people you are talking to,  "before they can finish them themselves?   x     17 How often do you have difficulty waiting your turn in situations when turn-taking is required?   x     18 How often do you interrupt others when they are busy?   x             Review of Systems  Constitutional, HEENT, cardiovascular, pulmonary, gi and gu systems are negative, except as otherwise noted.      Objective    /64   Pulse 82   Temp 97.2  F (36.2  C) (Temporal)   Resp 18   Ht 1.756 m (5' 9.13\")   Wt 108.2 kg (238 lb 8 oz)   LMP  (LMP Unknown)   SpO2 99%   BMI 35.08 kg/m    Body mass index is 35.08 kg/m .  Physical Exam   GENERAL: alert and no distress  NECK: no adenopathy, no asymmetry, masses, or scars  RESP: lungs clear to auscultation - no rales, rhonchi or wheezes  CV: regular rate and rhythm, normal S1 S2, no S3 or S4, no murmur, click or rub, no peripheral edema  ABDOMEN: soft, nontender, no hepatosplenomegaly, no masses and bowel sounds normal  MS: no gross musculoskeletal defects noted, no edema          Signed Electronically by: Madelin Burrell MD    "

## 2025-06-13 ENCOUNTER — ANCILLARY PROCEDURE (OUTPATIENT)
Dept: MAMMOGRAPHY | Facility: CLINIC | Age: 40
End: 2025-06-13
Attending: FAMILY MEDICINE
Payer: COMMERCIAL

## 2025-06-13 DIAGNOSIS — Z12.31 VISIT FOR SCREENING MAMMOGRAM: ICD-10-CM

## 2025-06-13 PROCEDURE — 77063 BREAST TOMOSYNTHESIS BI: CPT

## 2025-06-24 ENCOUNTER — TELEPHONE (OUTPATIENT)
Dept: FAMILY MEDICINE | Facility: CLINIC | Age: 40
End: 2025-06-24
Payer: COMMERCIAL

## 2025-06-24 NOTE — TELEPHONE ENCOUNTER
WEGOVY 0.25MG/0.5ML PEN    DRUG IS OFF FORMULARY.  YOU MAY CONSIDER PRESCRIBING A COVERED ALTERNATIVE IF APPROPRIATE OR CONTACT THE PLAN TO REQUEST AN EXCEPTION ALT DRUGS WEGOVY.    [facility]

## 2025-06-24 NOTE — TELEPHONE ENCOUNTER
Noted.   Please notify patient of the info below- Wegovy is now off her Formulary?.   Please have patient check in with her insurance regarding alternative weight loss meds that are covered or she could opt to pay out of pocket. Thanks.

## 2025-06-24 NOTE — TELEPHONE ENCOUNTER
Patient stated that she is currently on Zepbound.    Patient stated understanding and agreeable with the plan of care.     Anna BOWIE RN  Triage Nurse  [facility]

## 2025-08-26 ENCOUNTER — OFFICE VISIT (OUTPATIENT)
Dept: FAMILY MEDICINE | Facility: CLINIC | Age: 40
End: 2025-08-26
Attending: FAMILY MEDICINE
Payer: COMMERCIAL

## 2025-08-26 VITALS
DIASTOLIC BLOOD PRESSURE: 70 MMHG | HEART RATE: 86 BPM | WEIGHT: 212.9 LBS | SYSTOLIC BLOOD PRESSURE: 98 MMHG | RESPIRATION RATE: 18 BRPM | OXYGEN SATURATION: 98 % | TEMPERATURE: 98.1 F | HEIGHT: 68 IN | BODY MASS INDEX: 32.27 KG/M2

## 2025-08-26 DIAGNOSIS — E78.2 MIXED HYPERLIPIDEMIA: ICD-10-CM

## 2025-08-26 DIAGNOSIS — E66.09 CLASS 2 OBESITY DUE TO EXCESS CALORIES WITHOUT SERIOUS COMORBIDITY WITH BODY MASS INDEX (BMI) OF 38.0 TO 38.9 IN ADULT: ICD-10-CM

## 2025-08-26 DIAGNOSIS — F90.2 ADHD (ATTENTION DEFICIT HYPERACTIVITY DISORDER), COMBINED TYPE: ICD-10-CM

## 2025-08-26 DIAGNOSIS — F43.23 ADJUSTMENT DISORDER WITH MIXED ANXIETY AND DEPRESSED MOOD: ICD-10-CM

## 2025-08-26 DIAGNOSIS — E66.812 CLASS 2 OBESITY DUE TO EXCESS CALORIES WITHOUT SERIOUS COMORBIDITY WITH BODY MASS INDEX (BMI) OF 38.0 TO 38.9 IN ADULT: ICD-10-CM

## 2025-08-26 DIAGNOSIS — Z13.1 SCREENING FOR DIABETES MELLITUS: ICD-10-CM

## 2025-08-26 DIAGNOSIS — Z00.00 ROUTINE GENERAL MEDICAL EXAMINATION AT A HEALTH CARE FACILITY: Primary | ICD-10-CM

## 2025-08-26 LAB
ERYTHROCYTE [DISTWIDTH] IN BLOOD BY AUTOMATED COUNT: 12.7 % (ref 10–15)
EST. AVERAGE GLUCOSE BLD GHB EST-MCNC: 91 MG/DL
HBA1C MFR BLD: 4.8 % (ref 0–5.6)
HCT VFR BLD AUTO: 40 % (ref 35–47)
HGB BLD-MCNC: 13.7 G/DL (ref 11.7–15.7)
MCH RBC QN AUTO: 30.3 PG (ref 26.5–33)
MCHC RBC AUTO-ENTMCNC: 34.3 G/DL (ref 31.5–36.5)
MCV RBC AUTO: 88.5 FL (ref 78–100)
PLATELET # BLD AUTO: 250 10E3/UL (ref 150–450)
RBC # BLD AUTO: 4.52 10E6/UL (ref 3.8–5.2)
WBC # BLD AUTO: 7.06 10E3/UL (ref 4–11)

## 2025-08-26 PROCEDURE — 85027 COMPLETE CBC AUTOMATED: CPT | Performed by: FAMILY MEDICINE

## 2025-08-26 PROCEDURE — 84443 ASSAY THYROID STIM HORMONE: CPT | Performed by: FAMILY MEDICINE

## 2025-08-26 PROCEDURE — 80053 COMPREHEN METABOLIC PANEL: CPT | Performed by: FAMILY MEDICINE

## 2025-08-26 PROCEDURE — 36415 COLL VENOUS BLD VENIPUNCTURE: CPT | Performed by: FAMILY MEDICINE

## 2025-08-26 PROCEDURE — 80061 LIPID PANEL: CPT | Performed by: FAMILY MEDICINE

## 2025-08-26 PROCEDURE — 83036 HEMOGLOBIN GLYCOSYLATED A1C: CPT | Performed by: FAMILY MEDICINE

## 2025-08-26 RX ORDER — ATORVASTATIN CALCIUM 20 MG/1
20 TABLET, FILM COATED ORAL DAILY
Qty: 90 TABLET | Refills: 3 | Status: SHIPPED | OUTPATIENT
Start: 2025-08-26

## 2025-08-26 RX ORDER — DEXTROAMPHETAMINE SACCHARATE, AMPHETAMINE ASPARTATE MONOHYDRATE, DEXTROAMPHETAMINE SULFATE AND AMPHETAMINE SULFATE 5; 5; 5; 5 MG/1; MG/1; MG/1; MG/1
20 CAPSULE, EXTENDED RELEASE ORAL DAILY
Qty: 90 CAPSULE | Refills: 0 | Status: SHIPPED | OUTPATIENT
Start: 2025-08-26

## 2025-08-26 RX ORDER — SERTRALINE HYDROCHLORIDE 100 MG/1
100 TABLET, FILM COATED ORAL DAILY
Qty: 90 TABLET | Refills: 1 | Status: SHIPPED | OUTPATIENT
Start: 2025-08-26

## 2025-08-26 SDOH — HEALTH STABILITY: PHYSICAL HEALTH: ON AVERAGE, HOW MANY DAYS PER WEEK DO YOU ENGAGE IN MODERATE TO STRENUOUS EXERCISE (LIKE A BRISK WALK)?: 4 DAYS

## 2025-08-26 ASSESSMENT — ANXIETY QUESTIONNAIRES
6. BECOMING EASILY ANNOYED OR IRRITABLE: SEVERAL DAYS
3. WORRYING TOO MUCH ABOUT DIFFERENT THINGS: SEVERAL DAYS
GAD7 TOTAL SCORE: 3
GAD7 TOTAL SCORE: 3
5. BEING SO RESTLESS THAT IT IS HARD TO SIT STILL: NOT AT ALL
1. FEELING NERVOUS, ANXIOUS, OR ON EDGE: SEVERAL DAYS
7. FEELING AFRAID AS IF SOMETHING AWFUL MIGHT HAPPEN: NOT AT ALL
2. NOT BEING ABLE TO STOP OR CONTROL WORRYING: NOT AT ALL

## 2025-08-26 ASSESSMENT — PATIENT HEALTH QUESTIONNAIRE - PHQ9
5. POOR APPETITE OR OVEREATING: NOT AT ALL
SUM OF ALL RESPONSES TO PHQ QUESTIONS 1-9: 3

## 2025-08-27 LAB
ALBUMIN SERPL BCG-MCNC: 4.5 G/DL (ref 3.5–5.2)
ALP SERPL-CCNC: 75 U/L (ref 40–150)
ALT SERPL W P-5'-P-CCNC: 15 U/L (ref 0–50)
ANION GAP SERPL CALCULATED.3IONS-SCNC: 12 MMOL/L (ref 7–15)
AST SERPL W P-5'-P-CCNC: 20 U/L (ref 0–45)
BILIRUB SERPL-MCNC: 0.7 MG/DL
BUN SERPL-MCNC: 10.3 MG/DL (ref 6–20)
CALCIUM SERPL-MCNC: 9.7 MG/DL (ref 8.8–10.4)
CHLORIDE SERPL-SCNC: 102 MMOL/L (ref 98–107)
CHOLEST SERPL-MCNC: 137 MG/DL
CREAT SERPL-MCNC: 0.76 MG/DL (ref 0.51–0.95)
EGFRCR SERPLBLD CKD-EPI 2021: >90 ML/MIN/1.73M2
FASTING STATUS PATIENT QL REPORTED: NO
FASTING STATUS PATIENT QL REPORTED: NO
GLUCOSE SERPL-MCNC: 88 MG/DL (ref 70–99)
HCO3 SERPL-SCNC: 27 MMOL/L (ref 22–29)
HDLC SERPL-MCNC: 41 MG/DL
LDLC SERPL CALC-MCNC: 70 MG/DL
NONHDLC SERPL-MCNC: 96 MG/DL
POTASSIUM SERPL-SCNC: 4.2 MMOL/L (ref 3.4–5.3)
PROT SERPL-MCNC: 7.2 G/DL (ref 6.4–8.3)
SODIUM SERPL-SCNC: 141 MMOL/L (ref 135–145)
TRIGL SERPL-MCNC: 132 MG/DL
TSH SERPL DL<=0.005 MIU/L-ACNC: 1.29 UIU/ML (ref 0.3–4.2)